# Patient Record
Sex: MALE | Race: WHITE | NOT HISPANIC OR LATINO | Employment: UNEMPLOYED | URBAN - METROPOLITAN AREA
[De-identification: names, ages, dates, MRNs, and addresses within clinical notes are randomized per-mention and may not be internally consistent; named-entity substitution may affect disease eponyms.]

---

## 2018-01-05 ENCOUNTER — HISTORICAL (OUTPATIENT)
Dept: RADIOLOGY | Facility: HOSPITAL | Age: 48
End: 2018-01-05

## 2018-06-08 ENCOUNTER — HISTORICAL (OUTPATIENT)
Dept: INTERNAL MEDICINE | Facility: CLINIC | Age: 48
End: 2018-06-08

## 2018-06-08 LAB
ABS NEUT (OLG): 3.49 X10(3)/MCL (ref 2.1–9.2)
ALBUMIN SERPL-MCNC: 4 GM/DL (ref 3.4–5)
ALBUMIN/GLOB SERPL: 1 RATIO (ref 1–2)
ALP SERPL-CCNC: 71 UNIT/L (ref 45–117)
ALT SERPL-CCNC: 24 UNIT/L (ref 12–78)
APPEARANCE, UA: ABNORMAL
AST SERPL-CCNC: 17 UNIT/L (ref 15–37)
BACTERIA #/AREA URNS AUTO: ABNORMAL /[HPF]
BASOPHILS # BLD AUTO: 0.02 X10(3)/MCL
BASOPHILS NFR BLD AUTO: 0 %
BILIRUB SERPL-MCNC: 0.8 MG/DL (ref 0.2–1)
BILIRUB UR QL STRIP: NEGATIVE
BILIRUBIN DIRECT+TOT PNL SERPL-MCNC: 0.2 MG/DL
BILIRUBIN DIRECT+TOT PNL SERPL-MCNC: 0.6 MG/DL
BUN SERPL-MCNC: 22 MG/DL (ref 7–18)
CALCIUM SERPL-MCNC: 9 MG/DL (ref 8.5–10.1)
CHLORIDE SERPL-SCNC: 106 MMOL/L (ref 98–107)
CHOLEST SERPL-MCNC: 170 MG/DL
CHOLEST/HDLC SERPL: 3.2 {RATIO} (ref 0–5)
CO2 SERPL-SCNC: 27 MMOL/L (ref 21–32)
COLOR UR: YELLOW
CREAT SERPL-MCNC: 1.3 MG/DL (ref 0.6–1.3)
EOSINOPHIL # BLD AUTO: 0.07 X10(3)/MCL
EOSINOPHIL NFR BLD AUTO: 1 %
ERYTHROCYTE [DISTWIDTH] IN BLOOD BY AUTOMATED COUNT: 13.2 % (ref 11.5–14.5)
EST. AVERAGE GLUCOSE BLD GHB EST-MCNC: 123 MG/DL
GLOBULIN SER-MCNC: 3.8 GM/ML (ref 2.3–3.5)
GLUCOSE (UA): NORMAL
GLUCOSE SERPL-MCNC: 94 MG/DL (ref 74–106)
HAV IGM SERPL QL IA: NONREACTIVE
HBA1C MFR BLD: 5.9 % (ref 4.2–6.3)
HBV CORE IGM SERPL QL IA: NONREACTIVE
HBV SURFACE AG SERPL QL IA: NEGATIVE
HCT VFR BLD AUTO: 48 % (ref 40–51)
HCV AB SERPL QL IA: NONREACTIVE
HDLC SERPL-MCNC: 53 MG/DL
HGB BLD-MCNC: 16.2 GM/DL (ref 13.5–17.5)
HGB UR QL STRIP: NEGATIVE
HIV 1+2 AB+HIV1 P24 AG SERPL QL IA: NONREACTIVE
HYALINE CASTS #/AREA URNS LPF: ABNORMAL /[LPF]
IMM GRANULOCYTES # BLD AUTO: 0.01 10*3/UL
IMM GRANULOCYTES NFR BLD AUTO: 0 %
KETONES UR QL STRIP: ABNORMAL
LDLC SERPL CALC-MCNC: 93 MG/DL (ref 0–130)
LEUKOCYTE ESTERASE UR QL STRIP: NEGATIVE
LYMPHOCYTES # BLD AUTO: 2.25 X10(3)/MCL
LYMPHOCYTES NFR BLD AUTO: 36 % (ref 13–40)
MCH RBC QN AUTO: 29.2 PG (ref 26–34)
MCHC RBC AUTO-ENTMCNC: 33.8 GM/DL (ref 31–37)
MCV RBC AUTO: 86.6 FL (ref 80–100)
MONOCYTES # BLD AUTO: 0.46 X10(3)/MCL
MONOCYTES NFR BLD AUTO: 7 % (ref 4–12)
NEUTROPHILS # BLD AUTO: 3.49 X10(3)/MCL
NEUTROPHILS NFR BLD AUTO: 55 X10(3)/MCL
NITRITE UR QL STRIP: NEGATIVE
PH UR STRIP: 5.5 [PH] (ref 4.5–8)
PLATELET # BLD AUTO: 196 X10(3)/MCL (ref 130–400)
PMV BLD AUTO: 10.9 FL (ref 7.4–10.4)
POTASSIUM SERPL-SCNC: 3.9 MMOL/L (ref 3.5–5.1)
PROT SERPL-MCNC: 7.8 GM/DL (ref 6.4–8.2)
PROT UR QL STRIP: 20 MG/DL
RBC # BLD AUTO: 5.54 X10(6)/MCL (ref 4.5–5.9)
RBC #/AREA URNS AUTO: ABNORMAL /[HPF]
SODIUM SERPL-SCNC: 143 MMOL/L (ref 136–145)
SP GR UR STRIP: 1.03 (ref 1–1.03)
SQUAMOUS #/AREA URNS LPF: ABNORMAL /[LPF]
TRIGL SERPL-MCNC: 122 MG/DL
TSH SERPL-ACNC: 1.06 MIU/L (ref 0.36–3.74)
UROBILINOGEN UR STRIP-ACNC: NORMAL
VLDLC SERPL CALC-MCNC: 24 MG/DL
WBC # SPEC AUTO: 6.3 X10(3)/MCL (ref 4.5–11)
WBC #/AREA URNS AUTO: ABNORMAL /HPF

## 2018-06-28 ENCOUNTER — HISTORICAL (OUTPATIENT)
Dept: ENDOSCOPY | Facility: HOSPITAL | Age: 48
End: 2018-06-28

## 2019-04-10 ENCOUNTER — HISTORICAL (OUTPATIENT)
Dept: INTERNAL MEDICINE | Facility: CLINIC | Age: 49
End: 2019-04-10

## 2019-04-10 LAB
ABS NEUT (OLG): 3.49 X10(3)/MCL (ref 2.1–9.2)
ALBUMIN SERPL-MCNC: 4.2 GM/DL (ref 3.4–5)
ALBUMIN/GLOB SERPL: 1.2 RATIO (ref 1.1–2)
ALP SERPL-CCNC: 77 UNIT/L (ref 45–117)
ALT SERPL-CCNC: 27 UNIT/L (ref 12–78)
AST SERPL-CCNC: 18 UNIT/L (ref 15–37)
BASOPHILS # BLD AUTO: 0.04 X10(3)/MCL
BASOPHILS NFR BLD AUTO: 1 %
BILIRUB SERPL-MCNC: 0.5 MG/DL (ref 0.2–1)
BILIRUBIN DIRECT+TOT PNL SERPL-MCNC: 0.2 MG/DL
BILIRUBIN DIRECT+TOT PNL SERPL-MCNC: 0.3 MG/DL
BUN SERPL-MCNC: 24 MG/DL (ref 7–18)
CALCIUM SERPL-MCNC: 9.3 MG/DL (ref 8.5–10.1)
CHLORIDE SERPL-SCNC: 106 MMOL/L (ref 98–107)
CHOLEST SERPL-MCNC: 178 MG/DL
CHOLEST/HDLC SERPL: 3.5 {RATIO} (ref 0–5)
CO2 SERPL-SCNC: 28 MMOL/L (ref 21–32)
CREAT SERPL-MCNC: 1 MG/DL (ref 0.6–1.3)
EOSINOPHIL # BLD AUTO: 0.08 10*3/UL
EOSINOPHIL NFR BLD AUTO: 1 %
ERYTHROCYTE [DISTWIDTH] IN BLOOD BY AUTOMATED COUNT: 12.4 % (ref 11.5–14.5)
EST. AVERAGE GLUCOSE BLD GHB EST-MCNC: 103 MG/DL
GLOBULIN SER-MCNC: 3.6 GM/ML (ref 2.3–3.5)
GLUCOSE SERPL-MCNC: 92 MG/DL (ref 74–106)
HBA1C MFR BLD: 5.2 % (ref 4.2–6.3)
HCT VFR BLD AUTO: 47.7 % (ref 40–51)
HDLC SERPL-MCNC: 51 MG/DL
HGB BLD-MCNC: 15.7 GM/DL (ref 13.5–17.5)
IMM GRANULOCYTES # BLD AUTO: 0.01 10*3/UL
IMM GRANULOCYTES NFR BLD AUTO: 0 %
LDLC SERPL CALC-MCNC: 105 MG/DL (ref 0–130)
LYMPHOCYTES # BLD AUTO: 2.47 X10(3)/MCL
LYMPHOCYTES NFR BLD AUTO: 37 % (ref 13–40)
MCH RBC QN AUTO: 28.4 PG (ref 26–34)
MCHC RBC AUTO-ENTMCNC: 32.9 GM/DL (ref 31–37)
MCV RBC AUTO: 86.4 FL (ref 80–100)
MONOCYTES # BLD AUTO: 0.53 X10(3)/MCL
MONOCYTES NFR BLD AUTO: 8 % (ref 4–12)
NEUTROPHILS # BLD AUTO: 3.49 X10(3)/MCL
NEUTROPHILS NFR BLD AUTO: 53 X10(3)/MCL
PLATELET # BLD AUTO: 221 X10(3)/MCL (ref 130–400)
PMV BLD AUTO: 10.4 FL (ref 7.4–10.4)
POTASSIUM SERPL-SCNC: 4 MMOL/L (ref 3.5–5.1)
PROT SERPL-MCNC: 7.8 GM/DL (ref 6.4–8.2)
RBC # BLD AUTO: 5.52 X10(6)/MCL (ref 4.5–5.9)
SODIUM SERPL-SCNC: 141 MMOL/L (ref 136–145)
TRIGL SERPL-MCNC: 108 MG/DL
TSH SERPL-ACNC: 1.14 MIU/L (ref 0.36–3.74)
VLDLC SERPL CALC-MCNC: 22 MG/DL
WBC # SPEC AUTO: 6.6 X10(3)/MCL (ref 4.5–11)

## 2019-10-09 ENCOUNTER — HISTORICAL (OUTPATIENT)
Dept: INTERNAL MEDICINE | Facility: CLINIC | Age: 49
End: 2019-10-09

## 2020-05-15 ENCOUNTER — HISTORICAL (OUTPATIENT)
Dept: INTERNAL MEDICINE | Facility: CLINIC | Age: 50
End: 2020-05-15

## 2020-05-15 LAB
ABS NEUT (OLG): 2.82 X10(3)/MCL (ref 2.1–9.2)
ALBUMIN SERPL-MCNC: 3.7 GM/DL (ref 3.4–5)
ALBUMIN/GLOB SERPL: 1 RATIO (ref 1.1–2)
ALP SERPL-CCNC: 69 UNIT/L (ref 45–117)
ALT SERPL-CCNC: 24 UNIT/L (ref 12–78)
AST SERPL-CCNC: 18 UNIT/L (ref 15–37)
BASOPHILS # BLD AUTO: 0 X10(3)/MCL (ref 0–0.2)
BASOPHILS NFR BLD AUTO: 0 %
BILIRUB SERPL-MCNC: 0.6 MG/DL (ref 0.2–1)
BILIRUBIN DIRECT+TOT PNL SERPL-MCNC: 0.2 MG/DL (ref 0–0.2)
BILIRUBIN DIRECT+TOT PNL SERPL-MCNC: 0.4 MG/DL
BUN SERPL-MCNC: 17 MG/DL (ref 7–18)
CALCIUM SERPL-MCNC: 8.9 MG/DL (ref 8.5–10.1)
CHLORIDE SERPL-SCNC: 108 MMOL/L (ref 98–107)
CHOLEST SERPL-MCNC: 167 MG/DL
CHOLEST/HDLC SERPL: 3.3 {RATIO} (ref 0–5)
CO2 SERPL-SCNC: 28 MMOL/L (ref 21–32)
CREAT SERPL-MCNC: 1.1 MG/DL (ref 0.6–1.3)
EOSINOPHIL # BLD AUTO: 0.1 X10(3)/MCL (ref 0–0.9)
EOSINOPHIL NFR BLD AUTO: 2 %
ERYTHROCYTE [DISTWIDTH] IN BLOOD BY AUTOMATED COUNT: 13.1 % (ref 11.5–14.5)
EST. AVERAGE GLUCOSE BLD GHB EST-MCNC: 120 MG/DL
GLOBULIN SER-MCNC: 3.8 GM/ML (ref 2.3–3.5)
GLUCOSE SERPL-MCNC: 100 MG/DL (ref 74–106)
HBA1C MFR BLD: 5.8 % (ref 4.2–6.3)
HCT VFR BLD AUTO: 46.9 % (ref 40–51)
HDLC SERPL-MCNC: 50 MG/DL (ref 40–59)
HGB BLD-MCNC: 15.4 GM/DL (ref 13.5–17.5)
IMM GRANULOCYTES # BLD AUTO: 0.01 10*3/UL
IMM GRANULOCYTES NFR BLD AUTO: 0 %
LDLC SERPL CALC-MCNC: 100 MG/DL
LYMPHOCYTES # BLD AUTO: 2.1 X10(3)/MCL (ref 0.6–4.6)
LYMPHOCYTES NFR BLD AUTO: 37 %
MCH RBC QN AUTO: 27.9 PG (ref 26–34)
MCHC RBC AUTO-ENTMCNC: 32.8 GM/DL (ref 31–37)
MCV RBC AUTO: 85 FL (ref 80–100)
MONOCYTES # BLD AUTO: 0.6 X10(3)/MCL (ref 0.1–1.3)
MONOCYTES NFR BLD AUTO: 10 %
NEUTROPHILS # BLD AUTO: 2.82 X10(3)/MCL (ref 2.1–9.2)
NEUTROPHILS NFR BLD AUTO: 50 %
PLATELET # BLD AUTO: 198 X10(3)/MCL (ref 130–400)
PMV BLD AUTO: 10.8 FL (ref 7.4–10.4)
POTASSIUM SERPL-SCNC: 4.2 MMOL/L (ref 3.5–5.1)
PROT SERPL-MCNC: 7.5 GM/DL (ref 6.4–8.2)
RBC # BLD AUTO: 5.52 X10(6)/MCL (ref 4.5–5.9)
SODIUM SERPL-SCNC: 140 MMOL/L (ref 136–145)
TRIGL SERPL-MCNC: 83 MG/DL
TSH SERPL-ACNC: 0.86 MIU/L (ref 0.36–3.74)
VLDLC SERPL CALC-MCNC: 17 MG/DL
WBC # SPEC AUTO: 5.7 X10(3)/MCL (ref 4.5–11)

## 2020-05-29 ENCOUNTER — HISTORICAL (OUTPATIENT)
Dept: RADIOLOGY | Facility: HOSPITAL | Age: 50
End: 2020-05-29

## 2021-06-21 ENCOUNTER — HISTORICAL (OUTPATIENT)
Dept: INTERNAL MEDICINE | Facility: CLINIC | Age: 51
End: 2021-06-21

## 2021-06-24 ENCOUNTER — HISTORICAL (OUTPATIENT)
Dept: ADMINISTRATIVE | Facility: HOSPITAL | Age: 51
End: 2021-06-24

## 2021-10-13 ENCOUNTER — HISTORICAL (OUTPATIENT)
Dept: GASTROENTEROLOGY | Facility: CLINIC | Age: 51
End: 2021-10-13

## 2021-10-15 ENCOUNTER — HISTORICAL (OUTPATIENT)
Dept: ENDOSCOPY | Facility: HOSPITAL | Age: 51
End: 2021-10-15

## 2022-01-25 ENCOUNTER — HISTORICAL (OUTPATIENT)
Dept: ADMINISTRATIVE | Facility: HOSPITAL | Age: 52
End: 2022-01-25

## 2022-01-25 LAB
ALBUMIN SERPL-MCNC: 4.1 GM/DL (ref 3.5–5)
ALBUMIN/GLOB SERPL: 1 RATIO (ref 1.1–2)
ALP SERPL-CCNC: 76 UNIT/L (ref 40–150)
ALT SERPL-CCNC: 21 UNIT/L (ref 0–55)
APPEARANCE, UA: CLEAR
AST SERPL-CCNC: 18 UNIT/L (ref 5–34)
BACTERIA SPEC CULT: ABNORMAL /HPF
BILIRUB SERPL-MCNC: 0.5 MG/DL
BILIRUB UR QL STRIP: NEGATIVE
BILIRUBIN DIRECT+TOT PNL SERPL-MCNC: 0.2 MG/DL (ref 0–0.5)
BILIRUBIN DIRECT+TOT PNL SERPL-MCNC: 0.3 MG/DL (ref 0–0.8)
BUN SERPL-MCNC: 23.2 MG/DL (ref 8.4–25.7)
CALCIUM SERPL-MCNC: 9.7 MG/DL (ref 8.7–10.5)
CHLORIDE SERPL-SCNC: 105 MMOL/L (ref 98–107)
CO2 SERPL-SCNC: 27 MMOL/L (ref 22–29)
COLOR UR: ABNORMAL
CREAT SERPL-MCNC: 1.13 MG/DL (ref 0.73–1.18)
EST CREAT CLEARANCE SER (OHS): 72.13 ML/MIN
GLOBULIN SER-MCNC: 4 GM/DL (ref 2.4–3.5)
GLUCOSE (UA): NORMAL /UL
GLUCOSE SERPL-MCNC: 104 MG/DL (ref 74–100)
HGB UR QL STRIP: NEGATIVE /HPF
HYALINE CASTS #/AREA URNS LPF: ABNORMAL /LPF
KETONES UR QL STRIP: NEGATIVE /UL
LEUKOCYTE ESTERASE UR QL STRIP: NEGATIVE
MUCOUS THREADS URNS QL MICRO: ABNORMAL /LPF
NITRITE UR QL STRIP: NEGATIVE
PH UR STRIP: 5.5 /UL (ref 4.5–8)
POTASSIUM SERPL-SCNC: 3.9 MMOL/L (ref 3.5–5.1)
PROT SERPL-MCNC: 8.1 GM/DL (ref 6.4–8.3)
PROT UR QL STRIP: NEGATIVE /UL
RBC #/AREA URNS HPF: ABNORMAL /HPF
SODIUM SERPL-SCNC: 139 MMOL/L (ref 136–145)
SP GR UR STRIP: 1.02 (ref 1–1.03)
SQUAMOUS EPITHELIAL, UA: ABNORMAL /HPF
UROBILINOGEN UR STRIP-ACNC: NORMAL /HPF
WBC #/AREA URNS HPF: ABNORMAL /HPF

## 2022-02-11 ENCOUNTER — HISTORICAL (OUTPATIENT)
Dept: ADMINISTRATIVE | Facility: HOSPITAL | Age: 52
End: 2022-02-11

## 2022-02-11 ENCOUNTER — HISTORICAL (OUTPATIENT)
Dept: RADIOLOGY | Facility: HOSPITAL | Age: 52
End: 2022-02-11

## 2022-02-22 ENCOUNTER — HISTORICAL (OUTPATIENT)
Dept: INTERNAL MEDICINE | Facility: CLINIC | Age: 52
End: 2022-02-22

## 2022-02-22 LAB
ABS NEUT (OLG): 4.58 (ref 2.1–9.2)
BASOPHILS # BLD AUTO: 0 10*3/UL (ref 0–0.2)
BASOPHILS NFR BLD AUTO: 0 %
EOSINOPHIL # BLD AUTO: 0.1 10*3/UL (ref 0–0.9)
EOSINOPHIL NFR BLD AUTO: 2 %
ERYTHROCYTE [DISTWIDTH] IN BLOOD BY AUTOMATED COUNT: 14 % (ref 11.5–14.5)
FLAG2 (OHS): 50
FLAG3 (OHS): 80
FLAGS (OHS): 90
HCT VFR BLD AUTO: 49 % (ref 40–51)
HGB BLD-MCNC: 16.2 G/DL (ref 13.5–17.5)
IMM GRANULOCYTES # BLD AUTO: 0.02 10*3/UL
IMM GRANULOCYTES NFR BLD AUTO: 0 %
LOW EVENT # SUSPECT FLAG (OHS): 90
LYMPHOCYTES # BLD AUTO: 2.4 10*3/UL (ref 0.6–4.6)
LYMPHOCYTES NFR BLD AUTO: 30 %
MANUAL DIFF? (OHS): NO
MCH RBC QN AUTO: 27.5 PG (ref 26–34)
MCHC RBC AUTO-ENTMCNC: 33.1 G/DL (ref 31–37)
MCV RBC AUTO: 83.1 FL (ref 80–100)
MO BLASTS SUSPECT FLAG (OHS): 40
MONOCYTES # BLD AUTO: 0.7 10*3/UL (ref 0.1–1.3)
MONOCYTES NFR BLD AUTO: 9 %
NEUTROPHILS # BLD AUTO: 4.58 10*3/UL (ref 2.1–9.2)
NEUTROPHILS NFR BLD AUTO: 58 %
NRBC BLD AUTO-RTO: 0 % (ref 0–0.2)
PLATELET # BLD AUTO: 224 10*3/UL (ref 130–400)
PLATELET CLUMPS SUSPECT FLAG (OHS): 10
PMV BLD AUTO: 11 FL (ref 7.4–10.4)
RBC # BLD AUTO: 5.9 10*6/UL (ref 4.5–5.9)
WBC # SPEC AUTO: 7.9 10*3/UL (ref 4.5–11)

## 2022-04-07 ENCOUNTER — HISTORICAL (OUTPATIENT)
Dept: ADMINISTRATIVE | Facility: HOSPITAL | Age: 52
End: 2022-04-07

## 2022-04-08 ENCOUNTER — HISTORICAL (OUTPATIENT)
Dept: GASTROENTEROLOGY | Facility: CLINIC | Age: 52
End: 2022-04-08

## 2022-04-08 LAB — SARS-COV-2 AG RESP QL IA.RAPID: NEGATIVE

## 2022-04-11 ENCOUNTER — HISTORICAL (OUTPATIENT)
Dept: ENDOSCOPY | Facility: HOSPITAL | Age: 52
End: 2022-04-11

## 2022-04-23 VITALS
BODY MASS INDEX: 33.26 KG/M2 | HEIGHT: 67 IN | SYSTOLIC BLOOD PRESSURE: 124 MMHG | WEIGHT: 211.88 LBS | DIASTOLIC BLOOD PRESSURE: 86 MMHG

## 2022-05-01 NOTE — HISTORICAL [facility] CERNER
This is a historical note converted from Ronald. Formatting and pictures may have been removed.  Please reference Ronald for original formatting and attached multimedia. Chief Complaint  Check up. Want to be checked for auto immune hepatitis. 2. Back pain  History of Present Illness  49 yo m with obesity tennis elbow gerd htn asa back pain chronic neuropathy asa  Review of Systems  neg cv, neg pulm, neg gi gu ros as in hpi  Physical Exam  Vitals & Measurements  T:?36.6? ?C (Oral)? HR:?62(Peripheral)? RR:?18? BP:?132/82?  HT:?170.00?cm? WT:?92.300?kg? BMI:?31.94?  aax4 nad  juliette eomi  cv rrr s1s2 no mgr  lungs cta no cr or whz  abd nt soft  ext no edema  neuro intact  Assessment/Plan  1.?Chronic GERD?K21.9  ?no com controlled? on current meds  ?  2.?Obesity?E66.9  ?dash  ?  3.?Joint pain?M25.50  ?no com  Ordered:  Actin (Smooth Muscle) Antibody-[facility] [id number], Routine collect, *Est. 06/24/21 3:00:00 CDT, Blood, Order for future visit, *Est. Stop date 06/24/21 3:00:00 CDT, Lab Collect, Joint pain, 06/24/21 7:38:00 CDT  CBC w/ Auto Diff, Routine collect, *Est. 06/24/21 3:00:00 CDT, Blood, Order for future visit, *Est. Stop date 06/24/21 3:00:00 CDT, Lab Collect, Joint pain, 06/24/21 7:38:00 CDT  Clinic Follow up, *Est. 12/24/21 3:00:00 CST, Order for future visit, Joint pain, [facility]  Comprehensive Metabolic Panel, Routine collect, *Est. 06/24/21 3:00:00 CDT, Blood, Order for future visit, *Est. Stop date 06/24/21 3:00:00 CDT, Lab Collect, Joint pain, 06/24/21 7:38:00 CDT  Free T4, Routine collect, *Est. 06/24/21 3:00:00 CDT, Blood, Order for future visit, *Est. Stop date 06/24/21 3:00:00 CDT, Lab Collect, Joint pain, 06/24/21 7:38:00 CDT  Hemoglobin A1C [facility], Routine collect, *Est. 06/24/21 3:00:00 CDT, Blood, Order for future visit, *Est. Stop date 06/24/21 3:00:00 CDT, Lab Collect, Joint pain, 06/24/21 7:38:00 CDT  Lipid Panel, Routine collect, *Est. 06/24/21 3:00:00 CDT, Blood, Order for future  visit, *Est. Stop date 06/24/21 3:00:00 CDT, Lab Collect, Joint pain, 06/24/21 7:38:00 CDT  Occult Blood Fecal Immunoassay, Routine collect, Stool, Order for future visit, *Est. 07/01/21 3:00:00 CDT, *Est. Stop date 07/01/21 3:00:00 CDT, Nurse collect, Joint pain  Thyroid Stimulating Hormone, Routine collect, *Est. 06/24/21 3:00:00 CDT, Blood, Order for future visit, *Est. Stop date 06/24/21 3:00:00 CDT, Lab Collect, Joint pain, 06/24/21 7:38:00 CDT  ?  4.?Back pain?M54.9  ?chronic hx back sx x ray  Ordered:  XR Spine Lumbar 2 or 3 Views, Routine, 06/24/21 7:38:00 CDT, None, Wheelchair, Patient Has IV?, Rad Type, Order for future visit, Back pain, Not Scheduled, 06/24/21 7:38:00 CDT  ?  Orders:  zoster vaccine, inactivated, 0.5 mL, form: Powder-Inj, IM, Once-Unscheduled, first dose 06/24/21 7:25:00 CDT  Office/Outpatient Visit Level 4 Established [id number] PC, HTN (hypertension), 06/24/21 7:37:00 CDT  Referrals  Clinic Follow up, *Est. 12/24/21 3:00:00 CST, Order for future visit, Joint pain, [facility]   Problem List/Past Medical History  Ongoing  Chronic GERD  HTN (hypertension)  Obesity  Historical  No qualifying data  Procedure/Surgical History  Drainage of Right Lower Leg Subcutaneous Tissue and Fascia, Open Approach (09/06/2019)  Incision and drainage of abscess (eg, carbuncle, suppurative hidradenitis, cutaneous or subcutaneous abscess, cyst, furuncle, or paronychia); complicated or multiple (09/06/2019)  Drainage of Right Lower Arm Subcutaneous Tissue and Fascia, Open Approach (05/26/2019)  Incision and drainage of abscess (eg, carbuncle, suppurative hidradenitis, cutaneous or subcutaneous abscess, cyst, furuncle, or paronychia); simple or single (05/26/2019)  Biopsy Gastrointestinal (06/28/2018)  Esophagogastroduodenoscopy (06/28/2018)  Esophagogastroduodenoscopy, flexible, transoral; with biopsy, single or multiple (06/28/2018)  Excision of Stomach, Via Natural or Artificial Opening Endoscopic  (06/28/2018)  Incision and drainage of abscess (eg, carbuncle, suppurative hidradenitis, cutaneous or subcutaneous abscess, cyst, furuncle, or paronychia); complicated or multiple (06/10/2017)  Cholecystectomy (None) (10/07/2016)  Inspection of Gallbladder, Percutaneous Endoscopic Approach (10/07/2016)  Resection of Gallbladder, Open Approach (10/07/2016)  Posterior vertebral joint(s) arthroplasty (eg, facet joint[s] replacement), including facetectomy, laminectomy, foraminotomy, and vertebral column fixation, injection of bone cement, when performed, including fluoroscopy, single level, lumbar spine   Medications  amLODIPine 5 mg oral tablet, 5 mg= 1 tab(s), Oral, Daily, 3 refills,? ?Not taking: dulp  amLODIPine 5 mg oral tablet, 5 mg= 1 tab(s), Oral, Daily, 3 refills  baclofen 10 mg oral tablet, See Instructions,? ?Not taking  gabapentin 300 mg oral capsule, 300 mg= 1 cap(s), Oral, TID, 5 refills,? ?Not taking  ibuprofen 800 mg oral tablet, 800 mg= 1 tab(s), Oral, TID, PRN, 2 refills,? ?Not taking: dulp  ibuprofen 800 mg oral tablet, 800 mg= 1 tab(s), Oral, TID, PRN, 1 refills  Norco 5 mg-325 mg oral tablet, 1 tab(s), Oral, q4hr, PRN,? ?Not taking: Last Dose Date/Time Unknown  omeprazole 20 mg oral DR capsule, 20 mg= 1 cap(s), Oral, Daily, 3 refills  Questran Light 4 g/5 g oral powder for reconstitution, 4 gm, Oral, QID, 11 refills,? ?Not taking: Last Dose Date/Time Unknown  Shingrix intramuscular injection, 0.5 mL, IM, Once-Unscheduled  Allergies  No Known Medication Allergies  Social History  Abuse/Neglect  No, No, Yes, 06/24/2021  No, No, Yes, 12/24/2020  No, No, Yes, 05/22/2020  Alcohol  Current, Beer, 3-5 times per week, Alcohol use interferes with work or home: No. Others hurt by drinking: No. Household alcohol concerns: No., 06/24/2021  Current, Beer, 3-5 times per week, 12/24/2020  Beer, 3-5 times per week, 4 drinks/episode average., 06/27/2018  Current, 1-2 times per week,  10/06/2016  Employment/School  disabled, Highest education level: High school. Operates hazardous equipment: No., 10/24/2018  Exercise  Self assessment: Fair condition., 10/24/2018  Home/Environment  Lives with Significant other. Living situation: Home/Independent. Alcohol abuse in household: No. Substance abuse in household: No. Smoker in household: No. Injuries/Abuse/Neglect in household: No. Feels unsafe at home: No. Family/Friends available for support: Yes. Concern for family members at home: No. Major illness in household: No. Financial concerns: No. TV/Computer concerns: No., 10/24/2018    Never in , 12/24/2020  Nutrition/Health  Regular, 10/20/2016  Sexual  Sexually active: Yes. Gender Identity Identifies as male., 12/24/2020  Spiritual/Cultural  Sikhism, 12/24/2020  Substance Use  Never, Drug use interferes with work/home: No. Household substance abuse concerns: No., 06/24/2021  Never, 12/24/2020  Never, 10/20/2016  Tobacco  Never (less than 100 in lifetime), N/A, 06/24/2021  Never (less than 100 in lifetime), N/A, 12/24/2020  Never (less than 100 in lifetime), N/A, 05/22/2020  Family History  Diabetes mellitus type 2: Mother and Father.  Hypertension.: Mother and Father.  Immunizations  Vaccine Date Status Comments   tetanus/diphtheria/pertussis, acel(Tdap) 06/05/2018 Given    influenza virus vaccine, inactivated - Not Given Patient Refuses     Health Maintenance  Health Maintenance  ???Pending?(in the next year)  ??? ??OverDue  ??? ? ? ?Influenza Vaccine due??10/01/20??and every 1??day(s)  ??? ? ? ?Alcohol Misuse Screening due??01/02/21??and every 1??year(s)  ??? ? ? ?Hypertension Management-BMP due??05/15/21??and every 1??year(s)  ??? ??Due?  ??? ? ? ?Aspirin Therapy for CVD Prevention due??06/24/21??and every 1??year(s)  ??? ? ? ?Colorectal Screening due??06/24/21??Unknown Frequency  ??? ? ? ?Hypertension Management-Education due??06/24/21??and every 1??year(s)  ??? ? ? ?Zoster Vaccine  due??06/24/21??Unknown Frequency  ??? ??Due In Future?  ??? ? ? ?Obesity Screening not due until??01/01/22??and every 1??year(s)  ??? ? ? ?Diabetes Screening not due until??06/21/22??and every 1??year(s)  ???Satisfied?(in the past 1 year)  ??? ??Satisfied?  ??? ? ? ?ADL Screening on??06/24/21.??Satisfied by Reshma Alston LPN  ??? ? ? ?Blood Pressure Screening on??06/24/21.??Satisfied by Reshma Alston LPN  ??? ? ? ?Body Mass Index Check on??06/24/21.??Satisfied by Reshma Alston LPN  ??? ? ? ?Depression Screening on??06/24/21.??Satisfied by Reshma Alston LPN  ??? ? ? ?Diabetes Screening on??06/21/21.??Satisfied by Funmilayo Presley  ??? ? ? ?Hypertension Management-Blood Pressure on??06/24/21.??Satisfied by Reshma Alston LPN  ??? ? ? ?Influenza Vaccine on??12/24/20.??Satisfied by Reshma Alston LPN  ??? ? ? ?Obesity Screening on??06/24/21.??Satisfied by Reshma Alston LPN  ??? ??Refused?  ??? ? ? ?Zoster Vaccine on??06/24/21.??Recorded by Reshma Alston LPN??Reason: Patient Refuses  ?

## 2022-05-01 NOTE — HISTORICAL [facility] CERNER
This is a historical note converted from [employer]ner. Formatting and pictures may have been removed.  Please reference Cerner for original formatting and attached multimedia. History of Present Illness  Patient is a 51 year old male with PMH of HTN, GERD, fall off ladder in 2003 where he broke his back with hardware in place (Dr. Stanton, Dr. Palacios), Obesity who presents to medicine clinic to establish care.  ?  Today, reports abdominal bloating periodically. Family notices he snores at night and sounds like he stops breathing, denies daytime sleepiness. Sleeps 5-6 hours per night uninterrupted. Reports SOB with minimal exertion or walking up a flight stairs. States he has left leg pain walking up stairs and numbness/tingling/shooting sharp pain in leg periodically. Had colonoscopy in 10/21, had 15 polyps removed 3 of which were pre-cancerous. Has follow up colonoscopy in 04/22 for surveillance.  ?  Allergies: NKDA  FH: heart disease, HTN, DM  SH: no tobacco use, drinks alcohol socially, no illicit drug use, currently on disability  PSH: cholecystectomy  Review of Systems  10 point ROS reviewed  Physical Exam  Vitals & Measurements  T:?36.5? ?C (Oral)? HR:?80(Peripheral)? RR:?18? BP:?124/86?  HT:?170.00?cm? WT:?96.100?kg? BMI:?33.25?  General:?well-developed well-nourished in no acute distress, abdominal obesity  Eye: PERRLA, EOMI, clear conjunctiva, eyelids normal  HENT:?AT, NC, no thyromegaly  Respiratory:?clear to auscultation bilaterally, symmetric expansion b/l  Cardiovascular:?regular rate and rhythm without murmurs, gallops or rubs, no peripheral edema  Gastrointestinal:?soft, non-tender, mildly-distended with normal bowel sounds, without masses to palpation  Musculoskeletal:?full range of motion of all extremities/spine without limitation or discomfort  Integumentary: no rashes or skin lesions present  Neurologic: AOx3, no signs of peripheral neurological deficit, motor/sensory function  intact  Assessment/Plan  HTN  continue amlodipine, BP controlled  ?  Abdominal bloating  Alcohol use disorder  RUQ US ordered along with Hep panel  ?  GERD  symptoms controlled, continue PPI  ?  Suspected CARL  sleep partner reports snoring at night, denies day time sleepiness  sleep study ordered  ?  SOB with exertion  reports previous work in an [employer] ([employer]) with possible hazardous chemical?exposures  no signs of volume overload on exam, lungs clear  echo ordered to r/o heart failure  ?  History of fall with hardware in place in lower back  symptoms controlled, not taking medicine for pain  reports occasional neuropathy in left lower extremity  ?  Obesity  follows [place] diet, drinks 4-6 beers every other night and cool aid  counselled regarding diet and exercise changes  ?  Prevention  Colonoscopy due 04/22  denies tobacco use  Zoster vaccine today, refuses Flu shot and Covid shot  Routine labs today, CBC/CMP/UA  RTC in 3 months  ?  Attending physician addendum  Patient discussed in clinic with medicine resident  Care provided is appropriate-await to see?results of echocardiogram and sleep study  Agree with above assessment and plan   Problem List/Past Medical History  Ongoing  Chronic GERD  HTN (hypertension)  Obesity  Historical  No qualifying data  Procedure/Surgical History  Colonoscopy (None) (10/15/2021)  Colonoscopy, flexible; with removal of tumor(s), polyp(s), or other lesion(s) by snare technique (10/15/2021)  Excision of Sigmoid Colon, Via Natural or Artificial Opening Endoscopic, Diagnostic (10/15/2021)  Excision of Transverse Colon, Via Natural or Artificial Opening Endoscopic, Diagnostic (10/15/2021)  Polypectomy (None) (10/15/2021)  Drainage of Right Lower Leg Subcutaneous Tissue and Fascia, Open Approach (09/06/2019)  Incision and drainage of abscess (eg, carbuncle, suppurative hidradenitis, cutaneous or subcutaneous abscess, cyst, furuncle, or paronychia); complicated or multiple  (09/06/2019)  Drainage of Right Lower Arm Subcutaneous Tissue and Fascia, Open Approach (05/26/2019)  Incision and drainage of abscess (eg, carbuncle, suppurative hidradenitis, cutaneous or subcutaneous abscess, cyst, furuncle, or paronychia); simple or single (05/26/2019)  Biopsy Gastrointestinal (06/28/2018)  Esophagogastroduodenoscopy (06/28/2018)  Esophagogastroduodenoscopy, flexible, transoral; with biopsy, single or multiple (06/28/2018)  Excision of Stomach, Via Natural or Artificial Opening Endoscopic (06/28/2018)  Incision and drainage of abscess (eg, carbuncle, suppurative hidradenitis, cutaneous or subcutaneous abscess, cyst, furuncle, or paronychia); complicated or multiple (06/10/2017)  Cholecystectomy (None) (10/07/2016)  Inspection of Gallbladder, Percutaneous Endoscopic Approach (10/07/2016)  Resection of Gallbladder, Open Approach (10/07/2016)  Posterior vertebral joint(s) arthroplasty (eg, facet joint[s] replacement), including facetectomy, laminectomy, foraminotomy, and vertebral column fixation, injection of bone cement, when performed, including fluoroscopy, single level, lumbar spine   Medications  amLODIPine 5 mg oral tablet, 5 mg= 1 tab(s), Oral, Daily, 3 refills  baclofen 10 mg oral tablet, See Instructions,? ?Not taking  ibuprofen 800 mg oral tablet, 800 mg= 1 tab(s), Oral, TID, PRN, 2 refills,? ?Not taking: dulp  Norco 5 mg-325 mg oral tablet, 1 tab(s), Oral, q4hr, PRN,? ?Not taking: Last Dose Date/Time Unknown  omeprazole 20 mg oral DR capsule, 20 mg= 1 cap(s), Oral, Daily, 3 refills  zoster vaccine, inactivated adjuvanted intramuscular injection, 0.5 mL, IM, Once-Unscheduled  Allergies  No Known Medication Allergies  Social History  Abuse/Neglect  No, No, Yes, 10/15/2021  Alcohol  Current, Beer, Daily, 10/15/2021  Employment/School  disabled, Highest education level: High school. Operates hazardous equipment: No., 10/24/2018  Exercise  Self assessment: Fair condition.,  10/24/2018  Home/Environment  Lives with Significant other. Living situation: Home/Independent. Alcohol abuse in household: No. Substance abuse in household: No. Smoker in household: No. Injuries/Abuse/Neglect in household: No. Feels unsafe at home: No. Family/Friends available for support: Yes. Concern for family members at home: No. Major illness in household: No. Financial concerns: No. TV/Computer concerns: No., 10/24/2018    Never in , 12/24/2020  Nutrition/Health  Regular, 10/20/2016  Sexual  Sexually active: Yes. Gender Identity Identifies as male., 12/24/2020  Spiritual/Cultural  Adventist, 12/24/2020  Substance Use  Never, 10/15/2021  Tobacco  Former smoker, quit more than 30 days ago, N/A, 10/15/2021  Family History  Diabetes mellitus type 2: Mother and Father.  Hypertension.: Mother and Father.  Immunizations  Vaccine Date Status Comments   zoster vaccine, inactivated 06/24/2021 Given    tetanus/diphtheria/pertussis, acel(Tdap) 06/05/2018 Given    influenza virus vaccine, inactivated - Not Given Patient Refuses     Health Maintenance  Health Maintenance  ???Pending?(in the next year)  ??? ??OverDue  ??? ? ? ?Influenza Vaccine due??10/01/21??and every 1??day(s)  ??? ??Due?  ??? ? ? ?Obesity Screening due??01/01/22??and every 1??year(s)  ??? ? ? ?Alcohol Misuse Screening due??01/02/22??and every 1??year(s)  ??? ? ? ?Aspirin Therapy for CVD Prevention due??01/25/22??and every 1??year(s)  ??? ? ? ?Hypertension Management-Education due??01/25/22??and every 1??year(s)  ??? ? ? ?Zoster Vaccine due??01/25/22??Unknown Frequency  ??? ??Due In Future?  ??? ? ? ?Blood Pressure Screening not due until??06/24/22??and every 1??year(s)  ??? ? ? ?Body Mass Index Check not due until??06/24/22??and every 1??year(s)  ??? ? ? ?Depression Screening not due until??06/24/22??and every 1??year(s)  ??? ? ? ?Diabetes Screening not due until??06/24/22??and every 1??year(s)  ??? ? ? ?Hypertension Management-Blood  Pressure not due until??06/24/22??and every 1??year(s)  ??? ? ? ?Hypertension Management-BMP not due until??06/24/22??and every 1??year(s)  ??? ? ? ?ADL Screening not due until??06/24/22??and every 1??year(s)  ???Satisfied?(in the past 1 year)  ??? ??Satisfied?  ??? ? ? ?ADL Screening on??06/24/21.??Satisfied by Reshma Alston LPN  ??? ? ? ?Blood Pressure Screening on??10/15/21.??Satisfied by Ruma Long RN  ??? ? ? ?Body Mass Index Check on??10/15/21.??Satisfied by Antonella Barth RN.  ??? ? ? ?Colorectal Screening on??10/15/21.  ??? ? ? ?Depression Screening on??06/24/21.??Satisfied by Reshma Alston LPN  ??? ? ? ?Diabetes Screening on??06/24/21.??Satisfied by Darshan Murillo Jr.  ??? ? ? ?Hypertension Management-Blood Pressure on??10/15/21.??Satisfied by Ruma Long RN  ??? ? ? ?Influenza Vaccine on??10/15/21.??Satisfied by Antonella Barth RN  ??? ? ? ?Lipid Screening on??06/24/21.??Satisfied by Darshan Murillo Jr.  ??? ? ? ?Obesity Screening on??10/15/21.??Satisfied by Antonella Barth RN  ??? ??Refused?  ??? ? ? ?Zoster Vaccine on??06/24/21.??Recorded by Reshma Alston LPN??Reason: Patient Refuses  ?

## 2022-05-05 ENCOUNTER — TELEPHONE (OUTPATIENT)
Dept: INTERNAL MEDICINE | Facility: CLINIC | Age: 52
End: 2022-05-05

## 2022-05-05 NOTE — TELEPHONE ENCOUNTER
Dr Lara,     Pt's wife called for recent results for stool sample that has been sent in.    Please advise.

## 2022-05-19 ENCOUNTER — HOSPITAL ENCOUNTER (EMERGENCY)
Facility: HOSPITAL | Age: 52
Discharge: HOME | End: 2022-05-19
Attending: STUDENT IN AN ORGANIZED HEALTH CARE EDUCATION/TRAINING PROGRAM
Payer: MEDICAID

## 2022-05-19 VITALS
HEART RATE: 65 BPM | SYSTOLIC BLOOD PRESSURE: 127 MMHG | RESPIRATION RATE: 16 BRPM | HEIGHT: 67 IN | OXYGEN SATURATION: 99 % | DIASTOLIC BLOOD PRESSURE: 81 MMHG | TEMPERATURE: 98 F | BODY MASS INDEX: 25.67 KG/M2 | WEIGHT: 163.56 LBS

## 2022-05-19 DIAGNOSIS — R10.31 RIGHT GROIN PAIN: Primary | ICD-10-CM

## 2022-05-19 DIAGNOSIS — K40.90 RIGHT INGUINAL HERNIA: ICD-10-CM

## 2022-05-19 PROCEDURE — 99282 EMERGENCY DEPT VISIT SF MDM: CPT

## 2022-05-19 NOTE — ED PROVIDER NOTES
Encounter Date: 5/19/2022       History     Chief Complaint   Patient presents with    Groin Pain     C/o right groin pain x 2 weeks. States has knot that he notices appeared. Denies any pain at present.      The patient is a 51-year-old  with past medical history for hypertension and GERD and chronic back pain after fall who presents to emergency department for a mass in right groin and occasional pain. Location is on right upper pubic area, no radiation to genitals or lower abdomen. The patient states he began to notice this approximately 2 weeks ago, his primary care physician is Dr. Lara who he saw on 04/22, mass was not present at that point.  The patient states that it is intermittent, he does not notice it all the time but is not sure what aggravates it or makes it worse.  Patient with no recent abdominal surgeries, no history of hernias in the past.  Patient states he has not had any dysuria, no pain on genital region.  Pain around the area does not aggravate the patient or limit his daily activities.  Patient has chronic pain in lower back, unsure if there is additional pain in the flank area. No other new complaints or other symptoms at this time.        Review of patient's allergies indicates:  No Known Allergies  Past Medical History:   Diagnosis Date    Hypertension      Past Surgical History:   Procedure Laterality Date    BACK SURGERY      CHOLECYSTECTOMY       History reviewed. No pertinent family history.  Social History     Tobacco Use    Smoking status: Former Smoker    Smokeless tobacco: Never Used   Substance Use Topics    Alcohol use: Not Currently    Drug use: Never     Review of Systems   Constitutional: Negative for chills and fever.   Respiratory: Negative for cough and shortness of breath.    Cardiovascular: Negative for chest pain and palpitations.   Gastrointestinal: Positive for abdominal pain. Negative for abdominal distention and nausea.   Genitourinary: Negative  for dysuria, flank pain, penile pain, penile swelling, scrotal swelling and testicular pain.   Musculoskeletal: Positive for back pain.   Neurological: Negative for weakness and numbness.       Physical Exam     Initial Vitals [05/19/22 1339]   BP Pulse Resp Temp SpO2   127/81 65 20 98.1 °F (36.7 °C) 99 %      MAP       --         Physical Exam    Constitutional: He appears well-developed and well-nourished.   HENT:   Head: Normocephalic and atraumatic.   Eyes: EOM are normal. Pupils are equal, round, and reactive to light.   Cardiovascular: Normal rate and regular rhythm.   Pulmonary/Chest: Breath sounds normal.   Abdominal: Abdomen is soft. Bowel sounds are normal. He exhibits no distension. There is no abdominal tenderness. Hernia confirmed positive in the right inguinal area. Hernia confirmed negative in the left inguinal area.   Genitourinary:    Testes and penis normal.   Cremasteric reflex is present.       Musculoskeletal:         General: Normal range of motion.     Lymphadenopathy: No inguinal adenopathy noted on the right or left side.   Neurological: He is alert and oriented to person, place, and time.   Skin: Skin is warm and dry. Capillary refill takes less than 2 seconds.         ED Course   Procedures  Labs Reviewed - No data to display       Imaging Results    None          Medications - No data to display  Medical Decision Making:   History:   Old Records Summarized: records from clinic visits and records from previous admission(s).  Initial Assessment:   Reducible Right Sided Direct Inguinal Hernia  Reducible Right Sided Indirect Inguinal Hernia  Femoral Hernia  Groin Pain  ED Management:  Performed physical exam that showed reducible hernia  No current pain or signs of strangulation  No indication for imaging or further workup at this time  Will refer for outpatient surgery clinic visit  Gave strict ED precautions for worsening pain or signs of strangulation                      Clinical  Impression:   Final diagnoses:  [R10.31] Right groin pain (Primary)  [K40.90] Right inguinal hernia          ED Disposition Condition    Discharge Stable        ED Prescriptions     None        Follow-up Information     Follow up With Specialties Details Why Contact Info    [facility] General Surgery Schedule an appointment as soon as possible for a visit in 1 week Will place referral [address] [facility] [address]  [phone]           Pete Salazar DO  Resident  05/19/22 3155

## 2022-05-21 NOTE — HISTORICAL [facility] CERNER
This is a historical note converted from [employer]. Formatting and pictures may have been removed.  Please reference [employer] for original formatting and attached multimedia. History of Present Illness  51M PMHx colonoscopy 10/2021 w/ 15 polyps found, 2 with high?grade dysplasia who is now presenting for repeat colonoscopy. Patient tolerated bowel prep in entirety and is having clear/yellow stools. Denies any abdominal pain, BRBPR, weight loss, fevers/chills.?He was recently diagnosed with kidney dysfunction and fatty liver. He changed his diet, eliminating fatty foods and more fruits/vegetable intake with recent 40 lbs weight loss in 2 months. Also noted increased loose/soft stools. No blood.  ?  ?  Review of Systems  12 point review of systems negative unless otherwise stated  Physical Exam  Vitals & Measurements  T:?37? ?C (Oral)? HR:?72(Monitored)? RR:?18? BP:?115/71? SpO2:?100%? WT:?76.2?kg? BMI:?27.32?  General: NAD. Laying in bed comfortably.  HEENT: Normocephalic. Atraumatic. Trachea midline.  CV: Regular rate.  Pulm: Nonlabored breathing. Symmetric chest expansion.  Abd: Soft. Nondistended. Nontender.  MSK: No calf tenderness.  Assessment/Plan  51M presenting for screening colonoscopy  ?  RBA of colonoscopy discussed with patient. Written consent obtained  Plan for colonoscopy with Dr. Santo  ?  ?  Aiyana Narayan MD  PGY2 General Surgery?   Problem List/Past Medical History  Ongoing  Chronic GERD  HTN (hypertension)  Obesity  Historical  No qualifying data  Procedure/Surgical History  Colonoscopy (None) (10/15/2021)  Colonoscopy, flexible; with removal of tumor(s), polyp(s), or other lesion(s) by snare technique (10/15/2021)  Excision of Sigmoid Colon, Via Natural or Artificial Opening Endoscopic, Diagnostic (10/15/2021)  Excision of Transverse Colon, Via Natural or Artificial Opening Endoscopic, Diagnostic (10/15/2021)  Polypectomy (None) (10/15/2021)  Drainage of Right Lower Leg Subcutaneous Tissue and  Fascia, Open Approach (09/06/2019)  Incision and drainage of abscess (eg, carbuncle, suppurative hidradenitis, cutaneous or subcutaneous abscess, cyst, furuncle, or paronychia); complicated or multiple (09/06/2019)  Drainage of Right Lower Arm Subcutaneous Tissue and Fascia, Open Approach (05/26/2019)  Incision and drainage of abscess (eg, carbuncle, suppurative hidradenitis, cutaneous or subcutaneous abscess, cyst, furuncle, or paronychia); simple or single (05/26/2019)  Biopsy Gastrointestinal (06/28/2018)  Esophagogastroduodenoscopy (06/28/2018)  Esophagogastroduodenoscopy, flexible, transoral; with biopsy, single or multiple (06/28/2018)  Excision of Stomach, Via Natural or Artificial Opening Endoscopic (06/28/2018)  Incision and drainage of abscess (eg, carbuncle, suppurative hidradenitis, cutaneous or subcutaneous abscess, cyst, furuncle, or paronychia); complicated or multiple (06/10/2017)  Cholecystectomy (None) (10/07/2016)  Inspection of Gallbladder, Percutaneous Endoscopic Approach (10/07/2016)  Resection of Gallbladder, Open Approach (10/07/2016)  Posterior vertebral joint(s) arthroplasty (eg, facet joint[s] replacement), including facetectomy, laminectomy, foraminotomy, and vertebral column fixation, injection of bone cement, when performed, including fluoroscopy, single level, lumbar spine   Medications  Inpatient  buffered lidocaine 2% - 0.5 ml syringe, 10 mg= 0.5 mL, Subcutaneous, As Directed  IVF Lactated Ringers LR Infusion 1,000 mL, 1000 mL, IV  Home  amLODIPine 5 mg oral tablet, 5 mg= 1 tab(s), Oral, Daily, 3 refills  omeprazole 20 mg oral DR capsule, 20 mg= 1 cap(s), Oral, Daily, 3 refills  Allergies  No Known Medication Allergies  Social History  Abuse/Neglect  No, 04/11/2022  Alcohol  Past, 04/08/2022  Employment/School  disabled, Highest education level: High school. Operates hazardous equipment: No., 10/24/2018  Exercise  Exercise frequency: Daily. Exercise type: Walking.,  01/25/2022  Home/Environment  Lives with Significant other. Living situation: Home/Independent. Alcohol abuse in household: No. Substance abuse in household: No. Smoker in household: No. Injuries/Abuse/Neglect in household: No. Feels unsafe at home: No. Family/Friends available for support: Yes. Concern for family members at home: No. Major illness in household: No. Financial concerns: No. TV/Computer concerns: No., 10/24/2018    Never in , 12/24/2020  Nutrition/Health  Regular, avoid acidic foods, Good, 01/25/2022  Sexual  Sexually active: Yes. Gender Identity Identifies as male., 12/24/2020  Spiritual/Cultural  Mandaeism, 12/24/2020  Substance Use  Never, 02/27/2022  Never, 10/15/2021  Tobacco  Former smoker, quit more than 30 days ago, N/A, 04/11/2022  Family History  Diabetes mellitus type 2: Mother and Father.  Hypertension.: Mother and Father.  Immunizations  Vaccine Date Status Comments   zoster vaccine, inactivated 01/25/2022 Given    zoster vaccine, inactivated 06/24/2021 Given    tetanus/diphtheria/pertussis, acel(Tdap) 06/05/2018 Given    influenza virus vaccine, inactivated - Not Given Patient Refuses         Patient with prior colonoscopy in October 2021 by Dr. Kuhn at?which time he had?a total of 15?polyps removed from the sigmoid and transverse, some measuring up to?30 mm in size?which were removed.? Pathology showed all tubular adenomas with 2 of the polyps showing focal high-grade dysplasia. he is here now for repeat colonoscopy.? He reports for the past 3 months his stools have been watery to loose, approximately 2 bowel movements a day?without any associated rectal bleeding or melena. ?He attributes this to change in his diet at which he is eating more?fruits and vegetables.? He was?previously a daily beer drinker, drinking sometimes 3-4 beers a day and more on the weekends. ?He reports not having any more beer for the past 3 months as well.? He denies any abdominal pain. ?His  weight is stable. ?He denies any family history of colon polyps or colon cancer.? This is his second colonoscopy.

## 2022-06-21 ENCOUNTER — OFFICE VISIT (OUTPATIENT)
Dept: SURGERY | Facility: CLINIC | Age: 52
End: 2022-06-21

## 2022-06-21 VITALS
OXYGEN SATURATION: 97 % | BODY MASS INDEX: 26.48 KG/M2 | HEIGHT: 65 IN | HEART RATE: 67 BPM | RESPIRATION RATE: 20 BRPM | SYSTOLIC BLOOD PRESSURE: 100 MMHG | WEIGHT: 158.94 LBS | TEMPERATURE: 98 F | DIASTOLIC BLOOD PRESSURE: 66 MMHG

## 2022-06-21 DIAGNOSIS — Z01.818 PRE-OP TESTING: ICD-10-CM

## 2022-06-21 DIAGNOSIS — K40.90 RIGHT INGUINAL HERNIA: Primary | ICD-10-CM

## 2022-06-21 DIAGNOSIS — R10.31 RIGHT GROIN PAIN: ICD-10-CM

## 2022-06-21 PROCEDURE — 99215 OFFICE O/P EST HI 40 MIN: CPT | Mod: PBBFAC

## 2022-06-21 RX ORDER — OMEPRAZOLE 20 MG/1
20 CAPSULE, DELAYED RELEASE ORAL DAILY
COMMUNITY
Start: 2022-03-09

## 2022-06-21 RX ORDER — SODIUM CHLORIDE 9 MG/ML
INJECTION, SOLUTION INTRAVENOUS CONTINUOUS
Status: CANCELLED | OUTPATIENT
Start: 2022-06-21

## 2022-06-21 RX ORDER — HEPARIN SODIUM 5000 [USP'U]/ML
5000 INJECTION, SOLUTION INTRAVENOUS; SUBCUTANEOUS ONCE
Status: CANCELLED | OUTPATIENT
Start: 2022-06-21

## 2022-06-21 NOTE — PROGRESS NOTES
Placed in room. Seen by Dr. Biswas. Spoke with patient. Surgery date is July 6, 2022. RTC in 2 weeks.

## 2022-06-21 NOTE — H&P (VIEW-ONLY)
General/Acute Care Surgery   History and Physical  Clinic Note     HD#0  POD#* No surgery found *    HPI  Patient is a 50yo M with PMHx of HTN, GERD, and back pain s/p surgical repair/fusion, and s/p lap cholecystectomy comes in for evaluation of right inguinal hernia. Patient states that he has noticed the bulge in his groin for the past 3-4 months after he recently lost a large amount of weight. Patient denies obstructive symptoms as well as nausea, vomiting, fever,chills, chest pain, SOB, motor/sensory changes, diarrhea, and constipation. No history of blood thinners.    Past Medical History: see above  Surgical History: lap jen, back surgery/fusion  Social History: quit smoking at the age of 21. Denies drug and EtOH use.    Review of Systems: 10 point ROS negative except as stated in HPI    Scheduled Meds:    Continuous Infusions:    PRN Meds:    Objective   GEN: NAD  NEURO: AAOx3  RESP: No increased WOB, equal rise and fall of the chest  CV: Regular rate  ABD: Soft, non tender, non distended. No guarding or rebound. Reducible right inguinal hernia present with no overlying skin changes.  : Miller none  EXT: Moving all extremities spontaneously     Labs  No results for input(s): WBC, HGB, HCT, PLT, PTT, INR in the last 72 hours.  No results for input(s): NA, K, CL, CO2, BUN, CREATININE, GLU, CALCIUM, MG, PHOS, PROT, ALBUMIN, BILITOT, AST, ALKPHOS, ALT in the last 72 hours.    Imaging  No orders to display        Assessment/Plan  50yo M with above PMHx comes to clinic for reducible right inguinal hernia.    - Scheduled for open right inguinal hernia repair with mesh July 6, 2022  - Consents obtained in clinic  - Case booked    Mitesh Love MD  [facility]    6/21/2022  11:34 AM

## 2022-06-21 NOTE — PROGRESS NOTES
General/Acute Care Surgery   History and Physical  Clinic Note     HD#0  POD#* No surgery found *    HPI  Patient is a 52yo M with PMHx of HTN, GERD, and back pain s/p surgical repair/fusion, and s/p lap cholecystectomy comes in for evaluation of right inguinal hernia. Patient states that he has noticed the bulge in his groin for the past 3-4 months after he recently lost a large amount of weight. Patient denies obstructive symptoms as well as nausea, vomiting, fever,chills, chest pain, SOB, motor/sensory changes, diarrhea, and constipation. No history of blood thinners.    Past Medical History: see above  Surgical History: lap jen, back surgery/fusion  Social History: quit smoking at the age of 21. Denies drug and EtOH use.    Review of Systems: 10 point ROS negative except as stated in HPI    Scheduled Meds:    Continuous Infusions:    PRN Meds:    Objective   GEN: NAD  NEURO: AAOx3  RESP: No increased WOB, equal rise and fall of the chest  CV: Regular rate  ABD: Soft, non tender, non distended. No guarding or rebound. Reducible right inguinal hernia present with no overlying skin changes.  : Miller none  EXT: Moving all extremities spontaneously     Labs  No results for input(s): WBC, HGB, HCT, PLT, PTT, INR in the last 72 hours.  No results for input(s): NA, K, CL, CO2, BUN, CREATININE, GLU, CALCIUM, MG, PHOS, PROT, ALBUMIN, BILITOT, AST, ALKPHOS, ALT in the last 72 hours.    Imaging  No orders to display        Assessment/Plan  52yo M with above PMHx comes to clinic for reducible right inguinal hernia.    - Scheduled for open right inguinal hernia repair with mesh July 6, 2022  - Consents obtained in clinic  - Case booked    Mitesh Love MD  [facility]    6/21/2022  11:34 AM

## 2022-06-22 ENCOUNTER — ANESTHESIA EVENT (OUTPATIENT)
Dept: SURGERY | Facility: HOSPITAL | Age: 52
End: 2022-06-22

## 2022-06-22 DIAGNOSIS — Z01.818 OTHER SPECIFIED PRE-OPERATIVE EXAMINATION: Primary | ICD-10-CM

## 2022-06-22 NOTE — ANESTHESIA PREPROCEDURE EVALUATION
06/22/2022  Samir Kimble is a 51 y.o., male with past HTN resolved after 50# weight loss, Hx abd swelling with nl ECHO and NO HX CHF, CARL    COVID STATUS: PREV. COVID=NEG; TEST DOS  BETA-BLOCKER: NONE    PAT NURSE PHONE INTERVIEW 6/28/22    PROBLEM LIST:  -  RIGHT INGUINAL HERNIA  -  HTN (PREV. on [facility])  -  CHF - 2/11/22 EF-55%, DOES NOT SEE CARDs  -  CKD - 4/22/22 GFR=84  -  CARL (per 1/25/22 DR. DELUNA) - NO Tx  -  GERD  -  CHRONIC BACK PAIN; Hx 2003 T10-L4 FUSION 2/2 T11 FRACTURE from FALL OFF LADDER  -  ETOH ABUSE - 1-2 BEERS/DAY MON-FRI, 12 PK on WEEKENDS    AM Rx DOS: OMEPRAZOLE     ORDERS -   SURGEON: 12/25/17 CXR; 1/20/18 EKG; 2/27/22 CBC; 4/22/22 CMP; NO NEW ORDERS  ANESTHESIA: ADD: EKG    Pre-op Assessment    I have reviewed the NPO Status.      Review of Systems  Anesthesia Hx:  Denies Family Hx of Anesthesia complications.   Denies Personal Hx of Anesthesia complications.       Physical Exam  General: Well nourished    Airway:  Mallampati: I   Mouth Opening: Normal  TM Distance: Normal  Tongue: Normal  Neck ROM: Normal ROM    Dental:  Intact  Missing many but denies loose  Chest/Lungs:  Clear to auscultation, Normal Respiratory Rate    Heart:  Rate: Normal  Rhythm: Regular Rhythm        Anesthesia Plan  Type of Anesthesia, risks & benefits discussed:    Anesthesia Type: Gen Supraglottic Airway  Intra-op Monitoring Plan: Standard ASA Monitors  Post Op Pain Control Plan: multimodal analgesia and IV/PO Opioids PRN  Induction:  IV  Airway Plan: Direct  Informed Consent: Informed consent signed with the Patient and all parties understand the risks and agree with anesthesia plan.  All questions answered.   ASA Score: 2  Day of Surgery Review of History & Physical: H&P Update referred to the surgeon/provider.I have interviewed and examined the patient. I have reviewed the patient's H&P dated: There are  no significant changes. H&P completed by Anesthesiologist.    Ready For Surgery From Anesthesia Perspective.     .          2/11/22 ECHO      12/25/17 CXR

## 2022-07-01 ENCOUNTER — OFFICE VISIT (OUTPATIENT)
Dept: PREADMISSION TESTING | Facility: HOSPITAL | Age: 52
End: 2022-07-01
Attending: SURGERY

## 2022-07-01 DIAGNOSIS — Z01.818 PRE-OP TESTING: ICD-10-CM

## 2022-07-01 DIAGNOSIS — Z01.818 OTHER SPECIFIED PRE-OPERATIVE EXAMINATION: ICD-10-CM

## 2022-07-01 PROCEDURE — 93005 ELECTROCARDIOGRAM TRACING: CPT

## 2022-07-05 ENCOUNTER — PATIENT MESSAGE (OUTPATIENT)
Dept: ADMINISTRATIVE | Facility: OTHER | Age: 52
End: 2022-07-05

## 2022-07-05 ENCOUNTER — PATIENT MESSAGE (OUTPATIENT)
Dept: SURGERY | Facility: HOSPITAL | Age: 52
End: 2022-07-05

## 2022-07-06 ENCOUNTER — HOSPITAL ENCOUNTER (OUTPATIENT)
Facility: HOSPITAL | Age: 52
Discharge: HOME | End: 2022-07-06
Attending: SURGERY | Admitting: SURGERY

## 2022-07-06 ENCOUNTER — ANESTHESIA (OUTPATIENT)
Dept: SURGERY | Facility: HOSPITAL | Age: 52
End: 2022-07-06

## 2022-07-06 DIAGNOSIS — K40.90 RIGHT INGUINAL HERNIA: Primary | ICD-10-CM

## 2022-07-06 DIAGNOSIS — R10.31 RIGHT GROIN PAIN: ICD-10-CM

## 2022-07-06 LAB
CTP QC/QA: YES
SARS-COV-2 AG RESP QL IA.RAPID: NEGATIVE

## 2022-07-06 PROCEDURE — 71000016 HC POSTOP RECOV ADDL HR: Performed by: SURGERY

## 2022-07-06 PROCEDURE — C1729 CATH, DRAINAGE: HCPCS | Performed by: SURGERY

## 2022-07-06 PROCEDURE — 63600175 PHARM REV CODE 636 W HCPCS: Performed by: STUDENT IN AN ORGANIZED HEALTH CARE EDUCATION/TRAINING PROGRAM

## 2022-07-06 PROCEDURE — 63600175 PHARM REV CODE 636 W HCPCS: Performed by: NURSE ANESTHETIST, CERTIFIED REGISTERED

## 2022-07-06 PROCEDURE — C1781 MESH (IMPLANTABLE): HCPCS | Performed by: SURGERY

## 2022-07-06 PROCEDURE — 36000707: Performed by: SURGERY

## 2022-07-06 PROCEDURE — 00830 ANES HERNIA RPR LWR ABD NOS: CPT | Performed by: SURGERY

## 2022-07-06 PROCEDURE — 36000706: Performed by: SURGERY

## 2022-07-06 PROCEDURE — 37000009 HC ANESTHESIA EA ADD 15 MINS: Performed by: SURGERY

## 2022-07-06 PROCEDURE — 71000015 HC POSTOP RECOV 1ST HR: Performed by: SURGERY

## 2022-07-06 PROCEDURE — 63600175 PHARM REV CODE 636 W HCPCS: Performed by: ANESTHESIOLOGY

## 2022-07-06 PROCEDURE — 71000033 HC RECOVERY, INTIAL HOUR: Performed by: SURGERY

## 2022-07-06 PROCEDURE — 25000003 PHARM REV CODE 250: Performed by: SURGERY

## 2022-07-06 PROCEDURE — 25000003 PHARM REV CODE 250: Performed by: NURSE ANESTHETIST, CERTIFIED REGISTERED

## 2022-07-06 PROCEDURE — 37000008 HC ANESTHESIA 1ST 15 MINUTES: Performed by: SURGERY

## 2022-07-06 DEVICE — BARD MESH
Type: IMPLANTABLE DEVICE | Site: GROIN | Status: FUNCTIONAL
Brand: BARD MESH

## 2022-07-06 RX ORDER — SODIUM CHLORIDE 9 MG/ML
INJECTION, SOLUTION INTRAVENOUS CONTINUOUS
Status: DISCONTINUED | OUTPATIENT
Start: 2022-07-06 | End: 2022-07-06 | Stop reason: HOSPADM

## 2022-07-06 RX ORDER — DIPHENHYDRAMINE HYDROCHLORIDE 50 MG/ML
6.25 INJECTION INTRAMUSCULAR; INTRAVENOUS ONCE AS NEEDED
Status: DISCONTINUED | OUTPATIENT
Start: 2022-07-06 | End: 2022-07-06

## 2022-07-06 RX ORDER — BUPIVACAINE HYDROCHLORIDE 2.5 MG/ML
INJECTION, SOLUTION EPIDURAL; INFILTRATION; INTRACAUDAL
Status: DISCONTINUED
Start: 2022-07-06 | End: 2022-07-06 | Stop reason: HOSPADM

## 2022-07-06 RX ORDER — HYDROMORPHONE HYDROCHLORIDE 1 MG/ML
0.2 INJECTION, SOLUTION INTRAMUSCULAR; INTRAVENOUS; SUBCUTANEOUS EVERY 5 MIN PRN
Status: DISCONTINUED | OUTPATIENT
Start: 2022-07-06 | End: 2022-07-06

## 2022-07-06 RX ORDER — HYDROCODONE BITARTRATE AND ACETAMINOPHEN 5; 325 MG/1; MG/1
1 TABLET ORAL EVERY 6 HOURS PRN
Qty: 15 TABLET | Refills: 0 | Status: SHIPPED | OUTPATIENT
Start: 2022-07-06 | End: 2023-01-23

## 2022-07-06 RX ORDER — LIDOCAINE HYDROCHLORIDE 20 MG/ML
INJECTION, SOLUTION EPIDURAL; INFILTRATION; INTRACAUDAL; PERINEURAL
Status: DISCONTINUED | OUTPATIENT
Start: 2022-07-06 | End: 2022-07-06

## 2022-07-06 RX ORDER — KETAMINE HCL IN 0.9 % NACL 50 MG/5 ML
SYRINGE (ML) INTRAVENOUS
Status: DISCONTINUED | OUTPATIENT
Start: 2022-07-06 | End: 2022-07-06

## 2022-07-06 RX ORDER — PROPOFOL 10 MG/ML
VIAL (ML) INTRAVENOUS
Status: DISCONTINUED | OUTPATIENT
Start: 2022-07-06 | End: 2022-07-06

## 2022-07-06 RX ORDER — HEPARIN SODIUM 5000 [USP'U]/ML
5000 INJECTION, SOLUTION INTRAVENOUS; SUBCUTANEOUS ONCE
Status: COMPLETED | OUTPATIENT
Start: 2022-07-06 | End: 2022-07-06

## 2022-07-06 RX ORDER — FENTANYL CITRATE 50 UG/ML
INJECTION, SOLUTION INTRAMUSCULAR; INTRAVENOUS
Status: DISCONTINUED | OUTPATIENT
Start: 2022-07-06 | End: 2022-07-06

## 2022-07-06 RX ORDER — EPHEDRINE SULFATE 50 MG/ML
INJECTION, SOLUTION INTRAVENOUS
Status: DISCONTINUED | OUTPATIENT
Start: 2022-07-06 | End: 2022-07-06

## 2022-07-06 RX ORDER — DEXAMETHASONE SODIUM PHOSPHATE 4 MG/ML
INJECTION, SOLUTION INTRA-ARTICULAR; INTRALESIONAL; INTRAMUSCULAR; INTRAVENOUS; SOFT TISSUE
Status: DISCONTINUED | OUTPATIENT
Start: 2022-07-06 | End: 2022-07-06

## 2022-07-06 RX ORDER — SODIUM CHLORIDE, SODIUM LACTATE, POTASSIUM CHLORIDE, CALCIUM CHLORIDE 600; 310; 30; 20 MG/100ML; MG/100ML; MG/100ML; MG/100ML
INJECTION, SOLUTION INTRAVENOUS CONTINUOUS
Status: DISCONTINUED | OUTPATIENT
Start: 2022-07-06 | End: 2022-07-06 | Stop reason: HOSPADM

## 2022-07-06 RX ORDER — ATROPINE SULFATE 0.4 MG/ML
INJECTION, SOLUTION ENDOTRACHEAL; INTRAMEDULLARY; INTRAMUSCULAR; INTRAVENOUS; SUBCUTANEOUS
Status: DISCONTINUED | OUTPATIENT
Start: 2022-07-06 | End: 2022-07-06

## 2022-07-06 RX ORDER — DROPERIDOL 2.5 MG/ML
0.25 INJECTION, SOLUTION INTRAMUSCULAR; INTRAVENOUS ONCE AS NEEDED
Status: DISCONTINUED | OUTPATIENT
Start: 2022-07-06 | End: 2022-07-06

## 2022-07-06 RX ORDER — METOCLOPRAMIDE HYDROCHLORIDE 5 MG/ML
10 INJECTION INTRAMUSCULAR; INTRAVENOUS ONCE AS NEEDED
Status: DISCONTINUED | OUTPATIENT
Start: 2022-07-06 | End: 2022-07-06

## 2022-07-06 RX ORDER — LIDOCAINE HYDROCHLORIDE 10 MG/ML
1 INJECTION, SOLUTION EPIDURAL; INFILTRATION; INTRACAUDAL; PERINEURAL ONCE
Status: DISCONTINUED | OUTPATIENT
Start: 2022-07-06 | End: 2022-07-06 | Stop reason: HOSPADM

## 2022-07-06 RX ORDER — ONDANSETRON 2 MG/ML
INJECTION INTRAMUSCULAR; INTRAVENOUS
Status: DISCONTINUED | OUTPATIENT
Start: 2022-07-06 | End: 2022-07-06

## 2022-07-06 RX ORDER — LIDOCAINE HYDROCHLORIDE 10 MG/ML
1 INJECTION, SOLUTION EPIDURAL; INFILTRATION; INTRACAUDAL; PERINEURAL ONCE
Status: ACTIVE | OUTPATIENT
Start: 2022-07-06

## 2022-07-06 RX ORDER — KETOROLAC TROMETHAMINE 30 MG/ML
INJECTION, SOLUTION INTRAMUSCULAR; INTRAVENOUS
Status: DISCONTINUED | OUTPATIENT
Start: 2022-07-06 | End: 2022-07-06

## 2022-07-06 RX ORDER — CEFAZOLIN SODIUM 1 G/3ML
2 INJECTION, POWDER, FOR SOLUTION INTRAMUSCULAR; INTRAVENOUS
Status: COMPLETED | OUTPATIENT
Start: 2022-07-06 | End: 2022-07-06

## 2022-07-06 RX ORDER — OXYCODONE HYDROCHLORIDE 5 MG/1
5 TABLET ORAL
Status: DISCONTINUED | OUTPATIENT
Start: 2022-07-06 | End: 2022-07-06 | Stop reason: HOSPADM

## 2022-07-06 RX ORDER — BUPIVACAINE HYDROCHLORIDE 2.5 MG/ML
INJECTION, SOLUTION EPIDURAL; INFILTRATION; INTRACAUDAL
Status: DISCONTINUED | OUTPATIENT
Start: 2022-07-06 | End: 2022-07-06 | Stop reason: HOSPADM

## 2022-07-06 RX ADMIN — SODIUM CHLORIDE, POTASSIUM CHLORIDE, SODIUM LACTATE AND CALCIUM CHLORIDE: 600; 310; 30; 20 INJECTION, SOLUTION INTRAVENOUS at 08:07

## 2022-07-06 RX ADMIN — HEPARIN SODIUM 5000 UNITS: 5000 INJECTION INTRAVENOUS; SUBCUTANEOUS at 06:07

## 2022-07-06 RX ADMIN — FENTANYL CITRATE 25 MCG: 50 INJECTION, SOLUTION INTRAMUSCULAR; INTRAVENOUS at 09:07

## 2022-07-06 RX ADMIN — ATROPINE SULFATE 0.4 MG: 0.4 INJECTION, SOLUTION INTRAMUSCULAR; INTRAVENOUS; SUBCUTANEOUS at 09:07

## 2022-07-06 RX ADMIN — FENTANYL CITRATE 50 MCG: 50 INJECTION, SOLUTION INTRAMUSCULAR; INTRAVENOUS at 09:07

## 2022-07-06 RX ADMIN — EPHEDRINE SULFATE 25 MG: 50 INJECTION INTRAVENOUS at 09:07

## 2022-07-06 RX ADMIN — LIDOCAINE HYDROCHLORIDE 70 MG: 20 INJECTION, SOLUTION EPIDURAL; INFILTRATION; INTRACAUDAL; PERINEURAL at 08:07

## 2022-07-06 RX ADMIN — PROPOFOL 150 MG: 10 INJECTION, EMULSION INTRAVENOUS at 08:07

## 2022-07-06 RX ADMIN — SODIUM CHLORIDE, POTASSIUM CHLORIDE, SODIUM LACTATE AND CALCIUM CHLORIDE: 600; 310; 30; 20 INJECTION, SOLUTION INTRAVENOUS at 07:07

## 2022-07-06 RX ADMIN — PROPOFOL 50 MG: 10 INJECTION, EMULSION INTRAVENOUS at 08:07

## 2022-07-06 RX ADMIN — DEXAMETHASONE SODIUM PHOSPHATE 8 MG: 4 INJECTION, SOLUTION INTRA-ARTICULAR; INTRALESIONAL; INTRAMUSCULAR; INTRAVENOUS; SOFT TISSUE at 08:07

## 2022-07-06 RX ADMIN — Medication 20 MG: at 09:07

## 2022-07-06 RX ADMIN — KETOROLAC TROMETHAMINE 30 MG: 30 INJECTION, SOLUTION INTRAMUSCULAR; INTRAVENOUS at 08:07

## 2022-07-06 RX ADMIN — PROPOFOL 50 MG: 10 INJECTION, EMULSION INTRAVENOUS at 09:07

## 2022-07-06 RX ADMIN — CEFAZOLIN 2 G: 330 INJECTION, POWDER, FOR SOLUTION INTRAMUSCULAR; INTRAVENOUS at 09:07

## 2022-07-06 RX ADMIN — ONDANSETRON 4 MG: 2 INJECTION INTRAMUSCULAR; INTRAVENOUS at 10:07

## 2022-07-06 RX ADMIN — Medication 30 MG: at 09:07

## 2022-07-06 NOTE — OP NOTE
INGUINAL HERNIA REPAIR OPERATIVE REPORT    Patient Name: Samir Kimble  Date of Admission: 7/6/2022  YOB: 1970  Date of procedure: 07/06/2022    Attending Surgeon: Evan Lyons MD    Resident Surgeon(s): Marla Padron, PGY-1    Pre-operative diagnosis:  1. R inguinal hernia    Post-operative diagnosis:   1. R inguinal hernia    Procedure:  1. R inguinal hernia repair    Anesthesia: general    Complications: none    Specimens: none    Blood loss: 5 cc    Indication:  Samir Kimble is a 51 y.o. male with PMH of hypertension, GERD, lap jen and spinal fushion who presented to clinical with a right inguinal hernia    Findings: Direct inguinal hernia    Procedure in detail:  The patient was brought to the operating theater and placed in a supine position.  General endotracheal anesthesia was induced.  The abdomen was prepped and draped in the usual sterile fashion.  Timeout was performed.  Perioperative antibiotics were administered.     Skin incision was made with a knife and deepened through Camper's and Leoncio's fascia with electrocautery until the aponeurosis of the external oblique was exposed. Using the knife an incision was made in the aponeurosis of the external oblique, blunt dissection was used to locate the external ring. The ilioinguinal nerve was identified and protected throughout the procedure. The spermatic cord was identified and blunt dissection was used to free it from surrounding tissue. The cord was encircled with a Penrose drain.     The direct hernia sac was identified medial to internal ring. The hernia sac was dissected away from surrounding tissue and reduced back through the abdominal wall defect. Three interrupted Prolene sutures were used to re approximate the defect in the abdominal wall. Bard soft mesh was cut into the appropriate size then interrupted Prolene suture was used to suture the mesh inferiolaterally to the inguinal ligament and superiomedially to the conjoint  tendon. The superior portion of the mesh was cut longitudinally down the center creating tails which were secured around the spermatic cord to create a new internal ring.     Penrose drain was removed. The external oblique aponeurosis and tala's fascia were closed using a running Vicryl stitch. The skin was closed using PDS in a subcuticular fashion and Dermabond was applied. All counts were correct at the end of the case. Dr. Lyons was present for the entirety of the procedure. Patient was transported to the recovery room in a stable condition.       Marla Padron MD   [facility] General Surgery PGY1

## 2022-07-06 NOTE — ANESTHESIA POSTPROCEDURE EVALUATION
Anesthesia Post Evaluation    Patient: Samir Kimble    Procedure(s) Performed: Procedure(s) (LRB):  REPAIR, HERNIA, INGUINAL (Right)    Final Anesthesia Type: general      Patient location during evaluation: PACU  Patient participation: Yes- Able to Participate  Level of consciousness: awake and alert  Post-procedure vital signs: reviewed and stable  Pain management: adequate  Airway patency: patent    PONV status at discharge: No PONV  Anesthetic complications: no      Cardiovascular status: hemodynamically stable  Respiratory status: unassisted  Hydration status: euvolemic  Follow-up not needed.          Vitals Value Taken Time   /84 07/06/22 1208   Temp 36.3 °C (97.3 °F) 07/06/22 1150   Pulse 66 07/06/22 1208   Resp 16 07/06/22 1208   SpO2 100 % 07/06/22 1208         Event Time   Out of Recovery 11:30:00         Pain/Moncho Score: Moncho Score: 10 (7/6/2022 11:50 AM)

## 2022-07-06 NOTE — ANESTHESIA PROCEDURE NOTES
Intubation    Date/Time: 7/6/2022 8:55 AM  Performed by: Vazquez Jiang CRNA  Authorized by: Jacqueline Arshad MD     Intubation:     Induction:  Intravenous    Intubated:  Postinduction    Mask Ventilation:  Easy mask    Attempts:  1    Attempted By:  CRNA    Difficult Airway Encountered?: No      Complications:  None    Airway Device:  Supraglottic airway/LMA    Airway Device Size:  4.0    Style/Cuff Inflation:  Other (see comments) (IGEL)    Secured at:  The lips    Placement Verified By:  Capnometry    Complicating Factors:  None    Findings Post-Intubation:  BS equal bilateral and atraumatic/condition of teeth unchanged

## 2022-07-06 NOTE — TRANSFER OF CARE
Anesthesia Transfer of Care Note    Patient: Samir Kibmle    Procedure(s) Performed: Procedure(s) (LRB):  REPAIR, HERNIA, INGUINAL (Right)    Patient location: PACU    Anesthesia Type: general    Transport from OR: Transported from OR on room air with adequate spontaneous ventilation    Post pain: adequate analgesia    Post assessment: no apparent anesthetic complications    Post vital signs: stable    Level of consciousness: sedated    Nausea/Vomiting: no nausea/vomiting    Complications: none    Transfer of care protocol was followed      Last vitals:   Visit Vitals  /75 (BP Location: Right arm, Patient Position: Lying)   Pulse (!) 48   Temp 36 °C (96.8 °F) (Temporal)   Resp 20   Wt 71.5 kg (157 lb 10.1 oz)   SpO2 100%   BMI 25.95 kg/m²

## 2022-07-06 NOTE — DISCHARGE SUMMARY
[facility] - Periop Services  Discharge Summary     Patient ID:  Samir Kimble  [id number]  51 y.o.  1970    Admit date: 7/6/2022    Discharge Date and Time:  07/06/2022 4:00 PM    Admitting Physician: Evan Lyons MD     Discharge Provider: Marla Padron MD    Reason for Admission: Right inguinal hernia [K40.90]  Right groin pain [R10.31]    Admission Condition: good    Procedures Performed: Procedure(s) (LRB):  REPAIR, HERNIA, INGUINAL (Right)    Hospital Course Pt was brought in for surgery on 7/6/22. Pt underwent right inguinal hernia repair with mesh. Patient was transported to the post-op floor. Pt had trouble urinating after surgery, a bladder scan was done and pt was retaining 820cc of urine. Pt was straight cathed and was d/c after return of urinary function.      Final Diagnoses:    Principal Problem: Inguinal hernia     Discharged Condition: good    Discharge Exam:  General: NAD, pt sitting comfortably in bed  HEENT: NCAT  Cardio: RRR  Resp: no increased WOB, satting well on RA  Abd: soft, non-tender, non-distended, Incision in R groin clean, dry, non-erythematous      Disposition: Home or Self Care    Follow Up/Patient Instructions:     Medications:  Reconciled Home Medications:      Medication List      START taking these medications    HYDROcodone-acetaminophen 5-325 mg per tablet  Commonly known as: NORCO  Take 1 tablet by mouth every 6 (six) hours as needed for Pain.        ASK your doctor about these medications    omeprazole 20 MG capsule  Commonly known as: PRILOSEC  Take 20 mg by mouth once daily.          Discharge Procedure Orders   Diet Adult Regular     Lifting restrictions   Order Comments: Do not lift more than 20 pounds for 2 weeks     Notify your health care provider if you experience any of the following:  temperature >100.4     Notify your health care provider if you experience any of the following:  persistent nausea and vomiting or diarrhea     Notify your health care  provider if you experience any of the following:  severe uncontrolled pain     Notify your health care provider if you experience any of the following:  redness, tenderness, or signs of infection (pain, swelling, redness, odor or green/yellow discharge around incision site)     No dressing needed   Order Comments: You can shower in two days. Do not submerged the incision in water for two weeks. Use an ice pack over the incision to help with swelling and pain. Make sure to wear supportive underwear, such as briefs.       Activity: no heavy lifting for 6 weeks  Diet: regular diet  Wound Care: keep wound clean and dry    Follow-up with 2 in weeks    Signed:  Marla Padron MD  7/6/2022  3:43 PM

## 2022-07-06 NOTE — DISCHARGE INSTRUCTIONS
*Keep follow up appointment in 2 weeks at the central clinic.  *Take pain medication as prescribed.  * Ice pack to groin will help with pain and swelling.  No driving or consuming alcohol for the next 24 hours or while taking narcotic pain medicine.  *No heavy lifting greater than 10-20 pounds.   *Please wear briefs for support of hernia surgical site and testicles.  *** Notify MD of any moderate to severe pain unrelieved by pain medicine or for any signs of infection including fever above 100.4, excessive redness or swelling, yellow/green foul smelling drainage, nausea or vomiting. Clinics number is [phone]. If it is after business hours, call [phone] and ask to have surgeon on call to contact you. Thanks for choosing [facility]! Have a smooth recovery!

## 2022-07-06 NOTE — HOSPITAL COURSE
Pt was brought in for surgery on 7/6/22. Pt underwent right inguinal hernia repair with mesh. Patient was transported to the post-op floor. Pt had trouble urinating after surgery, a bladder scan was done and pt was retaining 820cc of urine. Pt was straight cathed and was d/c after return of urinary function.

## 2022-07-07 VITALS
WEIGHT: 157.63 LBS | SYSTOLIC BLOOD PRESSURE: 122 MMHG | TEMPERATURE: 98 F | DIASTOLIC BLOOD PRESSURE: 81 MMHG | OXYGEN SATURATION: 100 % | RESPIRATION RATE: 18 BRPM | BODY MASS INDEX: 25.95 KG/M2 | HEART RATE: 60 BPM

## 2022-07-14 ENCOUNTER — OFFICE VISIT (OUTPATIENT)
Dept: INTERNAL MEDICINE | Facility: CLINIC | Age: 52
End: 2022-07-14

## 2022-07-14 VITALS
HEIGHT: 65 IN | WEIGHT: 162.69 LBS | SYSTOLIC BLOOD PRESSURE: 118 MMHG | TEMPERATURE: 98 F | DIASTOLIC BLOOD PRESSURE: 80 MMHG | BODY MASS INDEX: 27.1 KG/M2 | RESPIRATION RATE: 20 BRPM | HEART RATE: 67 BPM

## 2022-07-14 DIAGNOSIS — K76.0 HEPATIC STEATOSIS: Primary | ICD-10-CM

## 2022-07-14 DIAGNOSIS — Z12.5 PROSTATE CANCER SCREENING: ICD-10-CM

## 2022-07-14 DIAGNOSIS — K29.70 GASTRITIS, PRESENCE OF BLEEDING UNSPECIFIED, UNSPECIFIED CHRONICITY, UNSPECIFIED GASTRITIS TYPE: ICD-10-CM

## 2022-07-14 DIAGNOSIS — I10 HYPERTENSION, UNSPECIFIED TYPE: ICD-10-CM

## 2022-07-14 NOTE — PROGRESS NOTES
[facility] Internal Medicine Resident Clinic    Subjective:      51-year-old caucasion male with the past medical history of hypertension, hepatic steatosis, gastritis and right inguinal hernia repair presents to [facility] for follow-up.  Last Wednesday patient had right inguinal hernia repair.  Patient states that anesthesia told him that he had congestive heart failure.  Overall patient states that he is feeling well.  Patient had a colonoscopy on 04/11/2022 which was filled 7 adenomatous polyps in the transverse colon and a adenomatous polyp in the sigmoid colon.  Patient is to have colonoscopy in 2025.  Patient had EGD in 2018 which revealed gastritis.  Patient denies fever, chills, lightheadedness, dizziness, chest pain, shortness a breath, abdominal pain, nausea, vomiting, dysuria, hematuria, hematochezia, diarrhea, constipation or peripheral edema.    Review of Systems:  10 point ROS negative except for HPI    Objective:   Vital Signs:  Vitals:    07/14/22 1221   BP: 118/80   Pulse: 67   Resp: 20   Temp: 98.4 °F (36.9 °C)        .FLOWAMB[14     Body mass index is 26.78 kg/m².     General:  Well developed, well nourished, no acute respiratory distress  Head: Normocephalic, atraumatic  Eyes: PERRL, EOMI, anicteric sclera  Throat: No posterior pharyngeal erythema or exudate, no tonsillar exudate  Neck: supple, normal ROM, no thyromegaly   CVS:  RRR, S1 and S2 normal, no murmurs, no added heart sounds, rubs, gallops, 2+ peripheral pulses  Resp:  Lungs clear to auscultation bilaterally, no wheezes, rales, or rhonci  GI:  Abdomen soft, non-tender, non-distended, normoactive bowel sounds  MSK:  No muscle atrophy, cyanosis, peripheral edema, full range of motion  Skin:  No rashes, ulcers, erythema  Neuro:  Alert and oriented x3, No focal neuro deficits, CNII-XII grossly intact  Psych:  Appropriate mood and affect         Laboratory:  Lab Results   Component Value Date    WBC 9.0 02/27/2022    HGB 15.2 02/27/2022    HCT  44.1 02/27/2022     02/27/2022    MCV 81.1 02/27/2022    RDW 13.6 02/27/2022    Lab Results   Component Value Date     02/27/2022    K 3.6 02/27/2022    CO2 24 02/27/2022    BUN 18.6 02/27/2022    CREATININE 1.28 02/27/2022    CALCIUM 9.4 02/27/2022      Lab Results   Component Value Date    HGBA1C 5.8 05/15/2020     (H) 05/15/2020    CREATININE 1.28 02/27/2022    Lab Results   Component Value Date    TSH 0.857 05/15/2020                .labDM:   Lab Results   Component Value Date    HGBA1C 5.8 05/15/2020     (H) 05/15/2020    CREATININE 1.28 02/27/2022     Thyroid:   Lab Results   Component Value Date    TSH 0.857 05/15/2020     Anemia: No results found for: IRON, TIBC, FERRITIN, [id number], FOLATE    Current Medications:  Current Outpatient Medications   Medication Instructions    HYDROcodone-acetaminophen (NORCO) 5-325 mg per tablet 1 tablet, Oral, Every 6 hours PRN    omeprazole (PRILOSEC) 20 mg, Oral, Daily        Assessment and Plan:      HTN  - 118/80  - stable not on medication      Hepatic Steatosis  - abdominal US 2/11/2022 hepatic steatosis with early changes from cirrhosis cannot be excluded  - repeat abdominal US is in 1 month.  - will order anti smooth muscle antibody as patient as a family history of autoimmune hepatitis  - patient has a history of drinking 3-4 beers a day for 10 years. He has stopped drinking alcohol in the past 8 months  - Well see patient again in 6 months        Right inguinal hernia repair  - 7/06/2022 right inguinal hernia repaired  - incision is healing well and intact      Hx Gastritis  - EGD performed in 2018  - omeprazole 20 mg    Health maintenance    4/11/2022 colonoscopy    Transverse colon polyp x7 s/p  Polypectomy:  - colonoscopy 4/11/2022  - Adenomatous polyps.  - No evidence of malignancy or high grade dysplasia.  - repeat colonoscopy in 2025 per GI     Sigmoid colon polyp s/p Polypectomy:  - colonoscopy 4/11/2022  - Adenomatous polyp.  -  No evidence of malignancy or high grade dysplasia.  - repeat colonoscopy in 2025 per GI      PSA ordered this visit  Zoster uptodate  Tdap uptodate  Hep c non reactive 2/22/2022      Health Maintenance   Topic Date Due    Hepatitis C Screening  Never done    TETANUS VACCINE  Never done    Lipid Panel  05/15/2025                  Gee Gilman MD

## 2022-07-19 ENCOUNTER — OFFICE VISIT (OUTPATIENT)
Dept: SURGERY | Facility: CLINIC | Age: 52
End: 2022-07-19

## 2022-07-19 VITALS
HEART RATE: 58 BPM | WEIGHT: 158.38 LBS | OXYGEN SATURATION: 97 % | HEIGHT: 66 IN | TEMPERATURE: 98 F | SYSTOLIC BLOOD PRESSURE: 131 MMHG | RESPIRATION RATE: 20 BRPM | BODY MASS INDEX: 25.45 KG/M2 | DIASTOLIC BLOOD PRESSURE: 85 MMHG

## 2022-07-19 DIAGNOSIS — Z87.19 S/P INGUINAL HERNIA REPAIR: Primary | ICD-10-CM

## 2022-07-19 DIAGNOSIS — Z98.890 S/P INGUINAL HERNIA REPAIR: Primary | ICD-10-CM

## 2022-07-19 PROCEDURE — 99213 OFFICE O/P EST LOW 20 MIN: CPT | Mod: PBBFAC

## 2022-07-19 NOTE — PROGRESS NOTES
I have reviewed the notes, assessments, and/or procedures performed by the resident, I concur with her/his documentation of Samir Kimble.     Azra Nicholson MD

## 2022-07-19 NOTE — PROGRESS NOTES
"Surgery Clinic Note     CC: s/p inguinal hernia repair 7/6/22    HPI:  51-year-old M s/p inguinal hernia repair 7/6/22. Denies pain, redness and drainage from incision site. Surgical site is healing well and he has not noticed any new bulges. He is tolerating a regular diet and having normal bowel function. Denies f/c/n/v/CP/SOB.    PMH: HTN, GERD, back pain, s/p lap jen    PSH:   Past Surgical History:   Procedure Laterality Date    BACK SURGERY      BACK SURGERY      CHOLECYSTECTOMY      HERNIA REPAIR         ROS: Negative except above     Current Outpatient Medications on File Prior to Visit   Medication Sig Dispense Refill    HYDROcodone-acetaminophen (NORCO) 5-325 mg per tablet Take 1 tablet by mouth every 6 (six) hours as needed for Pain. (Patient not taking: No sig reported) 15 tablet 0    omeprazole (PRILOSEC) 20 MG capsule Take 20 mg by mouth once daily.       Current Facility-Administered Medications on File Prior to Visit   Medication Dose Route Frequency Provider Last Rate Last Admin    LIDOcaine (PF) 10 mg/ml (1%) injection 10 mg  1 mL Intradermal Once Yaritza Suarez, SARMAD           Physical Exam  /85 (BP Location: Right arm, Patient Position: Sitting, BP Method: Medium (Automatic))   Pulse (!) 58   Temp 97.9 °F (36.6 °C) (Oral)   Resp 20   Ht 5' 6" (1.676 m)   Wt 71.8 kg (158 lb 6.4 oz)   SpO2 97%   BMI 25.57 kg/m²   General: NAD, AAO X 3  CV: RRR  Resp: Clear to ascultation bilaterally  Abdomen: soft, non-tender, non-distended, bowel sounds present  Skin: Right groin incision is clean, dry and intact, no erythema or drainage      ASSESSMENT/PLAN  51-year-old M s/p inguinal hernia repair 7/6/22.    -Doing well post-op  -RTC prn    Michaela Curtis, MS3    Patient seen and examined with Student Doctor Ever. Agree with assessment and plan as documented. Above note reviewed and edited as necessary.    Belinda Edmonds MD  [employer] General Surgery, PGY-3    "

## 2022-07-19 NOTE — PROGRESS NOTES
Patient seen by DR. ITALO Edmonds. Will return prn. Written and verbal discharge instructions given.

## 2022-07-26 ENCOUNTER — HOSPITAL ENCOUNTER (EMERGENCY)
Facility: HOSPITAL | Age: 52
Discharge: HOME | End: 2022-07-26
Attending: INTERNAL MEDICINE
Payer: MEDICAID

## 2022-07-26 VITALS
TEMPERATURE: 98 F | SYSTOLIC BLOOD PRESSURE: 119 MMHG | DIASTOLIC BLOOD PRESSURE: 82 MMHG | RESPIRATION RATE: 16 BRPM | BODY MASS INDEX: 25.86 KG/M2 | HEART RATE: 58 BPM | HEIGHT: 66 IN | OXYGEN SATURATION: 100 % | WEIGHT: 160.94 LBS

## 2022-07-26 DIAGNOSIS — Z87.19 HISTORY OF INGUINAL HERNIA REPAIR: ICD-10-CM

## 2022-07-26 DIAGNOSIS — Z98.890 HISTORY OF INGUINAL HERNIA REPAIR: ICD-10-CM

## 2022-07-26 DIAGNOSIS — R30.0 DYSURIA: Primary | ICD-10-CM

## 2022-07-26 LAB
APPEARANCE UR: CLEAR
BACTERIA #/AREA URNS AUTO: NORMAL /HPF
BILIRUB UR QL STRIP.AUTO: NEGATIVE MG/DL
COLOR UR AUTO: COLORLESS
GLUCOSE UR QL STRIP.AUTO: NORMAL MG/DL
HYALINE CASTS #/AREA URNS LPF: NORMAL /LPF
KETONES UR QL STRIP.AUTO: NEGATIVE MG/DL
LEUKOCYTE ESTERASE UR QL STRIP.AUTO: NEGATIVE UNIT/L
NITRITE UR QL STRIP.AUTO: NEGATIVE
PH UR STRIP.AUTO: 7 [PH]
PROT UR QL STRIP.AUTO: NEGATIVE MG/DL
RBC #/AREA URNS AUTO: NORMAL /HPF
RBC UR QL AUTO: NEGATIVE UNIT/L
SP GR UR STRIP.AUTO: 1
SQUAMOUS #/AREA URNS LPF: NORMAL /HPF
UROBILINOGEN UR STRIP-ACNC: NORMAL MG/DL
WBC #/AREA URNS AUTO: NORMAL /HPF

## 2022-07-26 PROCEDURE — 81001 URINALYSIS AUTO W/SCOPE: CPT | Performed by: PHYSICIAN ASSISTANT

## 2022-07-26 PROCEDURE — 99283 EMERGENCY DEPT VISIT LOW MDM: CPT | Mod: 25

## 2022-07-26 NOTE — Clinical Note
"Samir Kaur" Shyanne was seen and treated in our emergency department on 7/26/2022.  He may return to work on 07/27/2022.       If you have any questions or concerns, please don't hesitate to call.       RN    "

## 2022-07-26 NOTE — ED PROVIDER NOTES
Encounter Date: 7/26/2022       History     Chief Complaint   Patient presents with    Dysuria     PT W CO INTERMITTENT DYSURIA X 2 WKS.  DENIES URETHRAL DC. RECENT HERNIA SURG. 3 WKS AGO. DENIES PAIN OR REDNESS TO SURG. SITE.  NO FEVER.       50 yo M w/ PMHx significant for recent R inguinal hernia repair on 7/6/22 presents to ED c/o mild dysuria. Patient reports dysuria began approx 3 days after surgery & second episode of urination produced the worst pain, but has been getting progressively better since then. States he brought this up at post-op appointment & was told it was normal, but to follow-up if symptoms persisted more than a week. Patient reports mild testicular pain, but otherwise denies all complaints & states surgical incision site healing well. Denies hematuria, penile discharge, scrotal swelling, incontinence, urinary retention, change in chronic back pain, change in bowel habits, blood in stool, F/C. VSS on arrival w/ NAD.        Review of patient's allergies indicates:  No Known Allergies  Past Medical History:   Diagnosis Date    Back pain with history of spinal surgery     Hypertension      Past Surgical History:   Procedure Laterality Date    BACK SURGERY      BACK SURGERY      CHOLECYSTECTOMY      HERNIA REPAIR       No family history on file.  Social History     Tobacco Use    Smoking status: Never Smoker    Smokeless tobacco: Never Used   Substance Use Topics    Alcohol use: Not Currently    Drug use: Never     Review of Systems   Constitutional: Negative for chills, fatigue and fever.   Eyes: Negative for discharge and redness.   Respiratory: Negative for shortness of breath.    Cardiovascular: Negative for chest pain.   Gastrointestinal: Negative for abdominal distention, abdominal pain, blood in stool, diarrhea, nausea and vomiting.   Endocrine: Negative for polydipsia, polyphagia and polyuria.   Genitourinary: Positive for dysuria and testicular pain. Negative for decreased  urine volume, difficulty urinating, enuresis, flank pain, frequency, genital sores, hematuria, penile discharge, penile pain, penile swelling, scrotal swelling and urgency.   Musculoskeletal: Negative for arthralgias, back pain and myalgias.   Skin: Negative for pallor and rash.   Allergic/Immunologic: Negative for immunocompromised state.   Neurological: Negative for dizziness, syncope and headaches.   Hematological: Negative for adenopathy.   All other systems reviewed and are negative.      Physical Exam     Initial Vitals [07/26/22 0729]   BP Pulse Resp Temp SpO2   119/82 (!) 58 16 97.9 °F (36.6 °C) 100 %      MAP       --         Physical Exam    Nursing note and vitals reviewed.  Constitutional: He appears well-developed and well-nourished. He is not diaphoretic. No distress.   HENT:   Head: Normocephalic and atraumatic.   Eyes: Conjunctivae are normal. Pupils are equal, round, and reactive to light.   Neck: Neck supple.   Normal range of motion.  Cardiovascular: Normal rate, regular rhythm, normal heart sounds and intact distal pulses. Exam reveals no gallop and no friction rub.    No murmur heard.  Pulmonary/Chest: Breath sounds normal. No respiratory distress. He has no wheezes. He has no rhonchi. He has no rales.   Abdominal: Abdomen is soft. Bowel sounds are normal. He exhibits no distension. There is no abdominal tenderness. There is no rebound and no guarding.   Musculoskeletal:         General: Normal range of motion.      Cervical back: Normal range of motion and neck supple.     Neurological: He is alert and oriented to person, place, and time. No cranial nerve deficit or sensory deficit.   Skin: Skin is warm and dry. Capillary refill takes less than 2 seconds. No erythema. No pallor.   Psychiatric: He has a normal mood and affect.         ED Course   Procedures  Labs Reviewed   URINALYSIS          Imaging Results    None          Medications - No data to display  Medical Decision Making:   Clinical  Tests:   Lab Tests: Ordered and Reviewed  UA wholly unremarkable w/ no evidence of infection or other abnormality. Physical exam unremarkable w/ no significant post-op abnormalities. Spoke w/ surgery on-call, as surgery was performed w/in last 30 days, & they recommended patient keep all scheduled follow-ups. Plan of care discussed w/ patient. ED precautions given.                      Clinical Impression:   Final diagnoses:  [R30.0] Dysuria (Primary)  [Z98.890, Z87.19] History of inguinal hernia repair          ED Disposition Condition    Discharge Stable        ED Prescriptions     None        Follow-up Information     Follow up With Specialties Details Why Contact Info    Gee Gilman MD Internal Medicine In 1 week  [address] [facility] [address]  [phone]      [facility] General Surgery  As needed [address] [facility] [id number] [phone]    [facility] - Emergency Dept Emergency Medicine  If symptoms worsen [address] [facility] [id number] [phone]           LUCIANO Pickett  07/26/22 0837

## 2022-07-26 NOTE — DISCHARGE INSTRUCTIONS
Report to Emergency Department if symptoms return or worsen; [facility] - Medicine Clinic Within 1 to 2 days, It is important that you follow up with your primary care provider or specialist if indicated for further evaluation, workup, and treatment as necessary. The exam and treatment you received in Emergency Department was for an urgent problem and NOT INTENDED AS COMPLETE CARE. It is important that you FOLLOW UP with a doctor for ongoing care. If your symptoms become WORSE or you DO NOT IMPROVE and you are unable to reach your health care provider, you should RETURN to the Emergency Department. The Emergency Department provider has provided a PRELIMINARY INTERPRETATION of all your studies. A final interpretation may be done after you are discharged. If a change in your diagnosis or treatment is needed WE WILL CONTACT YOU. It is critical that we have a CURRENT PHONE NUMBER FOR YOU.

## 2022-08-05 ENCOUNTER — LAB VISIT (OUTPATIENT)
Dept: LAB | Facility: HOSPITAL | Age: 52
End: 2022-08-05
Payer: MEDICAID

## 2022-08-05 DIAGNOSIS — Z12.5 PROSTATE CANCER SCREENING: ICD-10-CM

## 2022-08-05 DIAGNOSIS — K76.0 HEPATIC STEATOSIS: ICD-10-CM

## 2022-08-05 LAB — PSA SERPL-MCNC: 1.63 NG/ML

## 2022-08-05 PROCEDURE — 84153 ASSAY OF PSA TOTAL: CPT

## 2022-08-05 PROCEDURE — 86255 FLUORESCENT ANTIBODY SCREEN: CPT

## 2022-08-05 PROCEDURE — 36415 COLL VENOUS BLD VENIPUNCTURE: CPT

## 2022-08-06 LAB — SMOOTH MUSCLE AB SER QL IF: NEGATIVE

## 2022-08-11 ENCOUNTER — TELEPHONE (OUTPATIENT)
Dept: INTERNAL MEDICINE | Facility: CLINIC | Age: 52
End: 2022-08-11

## 2022-08-11 NOTE — TELEPHONE ENCOUNTER
Called patient to inform of normal test results per Dr Gilman, patient verbalized understanding and pleased

## 2022-08-11 NOTE — TELEPHONE ENCOUNTER
----- Message from Gee Gilman MD sent at 8/11/2022 11:04 AM CDT -----  Lab test for autoimmune hepatitis was negative

## 2022-08-17 ENCOUNTER — HOSPITAL ENCOUNTER (OUTPATIENT)
Dept: RADIOLOGY | Facility: HOSPITAL | Age: 52
Discharge: HOME | End: 2022-08-17

## 2022-08-17 DIAGNOSIS — K76.0 HEPATIC STEATOSIS: ICD-10-CM

## 2022-08-17 PROCEDURE — 76705 ECHO EXAM OF ABDOMEN: CPT | Mod: TC

## 2022-09-06 ENCOUNTER — TELEPHONE (OUTPATIENT)
Dept: INTERNAL MEDICINE | Facility: CLINIC | Age: 52
End: 2022-09-06

## 2022-09-06 NOTE — TELEPHONE ENCOUNTER
Pt wife stated pt had an US done 3 weeks ago and they still have not heard any results. Pt is requesting a call back with these results.

## 2023-01-23 ENCOUNTER — OFFICE VISIT (OUTPATIENT)
Dept: INTERNAL MEDICINE | Facility: CLINIC | Age: 53
End: 2023-01-23
Payer: MEDICAID

## 2023-01-23 VITALS
OXYGEN SATURATION: 100 % | WEIGHT: 162 LBS | RESPIRATION RATE: 18 BRPM | HEART RATE: 55 BPM | BODY MASS INDEX: 26.03 KG/M2 | HEIGHT: 66 IN | SYSTOLIC BLOOD PRESSURE: 129 MMHG | DIASTOLIC BLOOD PRESSURE: 86 MMHG | TEMPERATURE: 98 F

## 2023-01-23 DIAGNOSIS — K29.70 GASTRITIS, PRESENCE OF BLEEDING UNSPECIFIED, UNSPECIFIED CHRONICITY, UNSPECIFIED GASTRITIS TYPE: ICD-10-CM

## 2023-01-23 DIAGNOSIS — I10 HYPERTENSION, UNSPECIFIED TYPE: Primary | ICD-10-CM

## 2023-01-23 DIAGNOSIS — M25.511 BILATERAL SHOULDER PAIN, UNSPECIFIED CHRONICITY: ICD-10-CM

## 2023-01-23 DIAGNOSIS — M25.512 BILATERAL SHOULDER PAIN, UNSPECIFIED CHRONICITY: ICD-10-CM

## 2023-01-23 DIAGNOSIS — K76.0 HEPATIC STEATOSIS: ICD-10-CM

## 2023-01-23 PROCEDURE — 99214 OFFICE O/P EST MOD 30 MIN: CPT | Mod: PBBFAC

## 2023-01-23 NOTE — PROGRESS NOTES
[facility] Internal Medicine Resident Clinic    Subjective:        52-year-old caucasion male with the past medical history of hypertension, hepatic steatosis, gastritis and right inguinal hernia repair presents to [facility] IM clinic for follow-up. Patient states that he is doing well but admits that he does have bilateral shoulder pain that comes and goes which gets better with ice/heating pads and rest. I advised patient if it gets worse to contact me.      Patient had a colonoscopy on 04/11/2022 which was filled 7 adenomatous polyps in the transverse colon and a adenomatous polyp in the sigmoid colon.  Patient is to have colonoscopy in 2025.  Patient had EGD in 2018 which revealed gastritis.  Patient denies fever, chills, lightheadedness, dizziness, chest pain, shortness a breath, abdominal pain, nausea, vomiting, dysuria, hematuria, hematochezia, diarrhea, constipation or peripheral edema.    Review of Systems:  10 point ROS negative except for HPI    Objective:   Vital Signs:  Vitals:    01/23/23 0827   BP: 129/86   Pulse: (!) 55   Resp: 18   Temp: 97.5 °F (36.4 °C)        Body mass index is 26.15 kg/m².     General:  Well developed, well nourished, no acute respiratory distress  Head: Normocephalic, atraumatic  Eyes: PERRL, EOMI, anicteric sclera  Throat: No posterior pharyngeal erythema or exudate, no tonsillar exudate  Neck: supple, normal ROM, no thyromegaly   CVS:  RRR, S1 and S2 normal, no murmurs, no added heart sounds, rubs, gallops, 2+ peripheral pulses  Resp:  Lungs clear to auscultation bilaterally, no wheezes, rales, or rhonci  GI:  Abdomen soft, non-tender, non-distended, normoactive bowel sounds  MSK:  No muscle atrophy, cyanosis, peripheral edema, full range of motion  Skin:  No rashes, ulcers, erythema  Neuro:  Alert and oriented x3, No focal neuro deficits, CNII-XII grossly intact  Psych:  Appropriate mood and affect         Laboratory:  Lab Results   Component Value Date    WBC 9.0 02/27/2022    HGB 15.2  02/27/2022    HCT 44.1 02/27/2022     02/27/2022    MCV 81.1 02/27/2022    RDW 13.6 02/27/2022    Lab Results   Component Value Date     02/27/2022    K 3.6 02/27/2022    CO2 24 02/27/2022    BUN 18.6 02/27/2022    CREATININE 1.28 02/27/2022    CALCIUM 9.4 02/27/2022      Lab Results   Component Value Date    HGBA1C 5.8 05/15/2020     (H) 05/15/2020    CREATININE 1.28 02/27/2022    Lab Results   Component Value Date    TSH 0.857 05/15/2020                .labDM:   Lab Results   Component Value Date    HGBA1C 5.8 05/15/2020     (H) 05/15/2020    CREATININE 1.28 02/27/2022     Thyroid:   Lab Results   Component Value Date    TSH 0.857 05/15/2020     Anemia: No results found for: IRON, TIBC, FERRITIN, [id number], FOLATE    Current Medications:  Current Outpatient Medications   Medication Instructions    HYDROcodone-acetaminophen (NORCO) 5-325 mg per tablet 1 tablet, Oral, Every 6 hours PRN    omeprazole (PRILOSEC) 20 mg, Oral, Daily        Assessment and Plan:     Bilateral Shoulder pain  - ice/heating pads and rest improves occasional pain. Patient has full range of motion  - patient does not want imaging at this time.   - advised patient if it gets worse then to get in contact with the office.      HTN  - 129/87  - stable not on medication  - patient walks 5-6 miles daily and has decreased his alcohol intake  - eats a balanced diet of fruits and vegetables.        Hepatic Steatosis  - abdominal US 2/11/2022 hepatic steatosis with early changes from cirrhosis cannot be excluded  - repeat abdominal US still reveals hepatic steatosis but improved from previous US  - patient is no longer a heavy drinker and walks 5-6 miles daily        Right inguinal hernia repair  - 7/06/2022 right inguinal hernia repaired  - incision is healing well and intact     Hx Gastritis  - EGD performed in 2018  - omeprazole 20 mg     Health maintenance      Transverse colon polyp x7 s/p  Polypectomy:  - colonoscopy  4/11/2022  - Adenomatous polyps.  - No evidence of malignancy or high grade dysplasia.  - repeat colonoscopy in 2025 per GI      Sigmoid colon polyp s/p Polypectomy:  - colonoscopy 4/11/2022  - Adenomatous polyp.  - No evidence of malignancy or high grade dysplasia.  - repeat colonoscopy in 2025 per GI       PSA - 1.63 on 8/5/2022  Zoster- 6/24/2021, 1/25/2022  Tdap - 6/15/2018  Hep c non reactive 2/22/2022  Health Maintenance   Topic Date Due    Lipid Panel  05/15/2025    TETANUS VACCINE  06/05/2028    Hepatitis C Screening  Completed                  Gee Gilman MD

## 2023-06-21 ENCOUNTER — PATIENT MESSAGE (OUTPATIENT)
Dept: ADMINISTRATIVE | Facility: HOSPITAL | Age: 53
End: 2023-06-21
Payer: MEDICAID

## 2024-01-16 ENCOUNTER — LAB VISIT (OUTPATIENT)
Dept: LAB | Facility: HOSPITAL | Age: 54
End: 2024-01-16

## 2024-01-16 DIAGNOSIS — I10 HYPERTENSION, UNSPECIFIED TYPE: ICD-10-CM

## 2024-01-16 LAB
ALBUMIN SERPL-MCNC: 4.2 G/DL (ref 3.5–5)
ALBUMIN/GLOB SERPL: 1.3 RATIO (ref 1.1–2)
ALP SERPL-CCNC: 61 UNIT/L (ref 40–150)
ALT SERPL-CCNC: 16 UNIT/L (ref 0–55)
APPEARANCE UR: CLEAR
AST SERPL-CCNC: 18 UNIT/L (ref 5–34)
BACTERIA #/AREA URNS AUTO: ABNORMAL /HPF
BILIRUB SERPL-MCNC: 0.6 MG/DL
BILIRUB UR QL STRIP.AUTO: NEGATIVE
BUN SERPL-MCNC: 15.5 MG/DL (ref 8.4–25.7)
CALCIUM SERPL-MCNC: 9.5 MG/DL (ref 8.4–10.2)
CHLORIDE SERPL-SCNC: 107 MMOL/L (ref 98–107)
CO2 SERPL-SCNC: 27 MMOL/L (ref 22–29)
COLOR UR AUTO: COLORLESS
CREAT SERPL-MCNC: 0.98 MG/DL (ref 0.73–1.18)
CREAT UR-MCNC: 11.7 MG/DL (ref 63–166)
CREAT UR-MCNC: 11.9 MG/DL (ref 63–166)
GFR SERPLBLD CREATININE-BSD FMLA CKD-EPI: >60 MLS/MIN/1.73/M2
GLOBULIN SER-MCNC: 3.3 GM/DL (ref 2.4–3.5)
GLUCOSE SERPL-MCNC: 100 MG/DL (ref 74–100)
GLUCOSE UR QL STRIP.AUTO: NORMAL
HYALINE CASTS #/AREA URNS LPF: ABNORMAL /LPF
KETONES UR QL STRIP.AUTO: NEGATIVE
LEUKOCYTE ESTERASE UR QL STRIP.AUTO: NEGATIVE
MICROALBUMIN UR-MCNC: <5 UG/ML
MICROALBUMIN/CREAT RATIO PNL UR: ABNORMAL
NITRITE UR QL STRIP.AUTO: NEGATIVE
PH UR STRIP.AUTO: 6 [PH]
POTASSIUM SERPL-SCNC: 4.1 MMOL/L (ref 3.5–5.1)
PROT SERPL-MCNC: 7.5 GM/DL (ref 6.4–8.3)
PROT UR QL STRIP.AUTO: NEGATIVE
PROT UR STRIP-MCNC: <6.8 MG/DL
RBC #/AREA URNS AUTO: ABNORMAL /HPF
RBC UR QL AUTO: NEGATIVE
SODIUM SERPL-SCNC: 141 MMOL/L (ref 136–145)
SP GR UR STRIP.AUTO: ABNORMAL
SQUAMOUS #/AREA URNS LPF: ABNORMAL /HPF
T3FREE SERPL-MCNC: 3.31 PG/ML (ref 1.58–3.91)
T4 FREE SERPL-MCNC: 0.95 NG/DL (ref 0.7–1.48)
TSH SERPL-ACNC: 1.93 UIU/ML (ref 0.35–4.94)
UROBILINOGEN UR STRIP-ACNC: NORMAL
WBC #/AREA URNS AUTO: ABNORMAL /HPF

## 2024-01-16 PROCEDURE — 81001 URINALYSIS AUTO W/SCOPE: CPT

## 2024-01-16 PROCEDURE — 84443 ASSAY THYROID STIM HORMONE: CPT

## 2024-01-16 PROCEDURE — 82570 ASSAY OF URINE CREATININE: CPT

## 2024-01-16 PROCEDURE — 84481 FREE ASSAY (FT-3): CPT

## 2024-01-16 PROCEDURE — 36415 COLL VENOUS BLD VENIPUNCTURE: CPT

## 2024-01-16 PROCEDURE — 80053 COMPREHEN METABOLIC PANEL: CPT

## 2024-01-16 PROCEDURE — 84439 ASSAY OF FREE THYROXINE: CPT

## 2024-01-16 PROCEDURE — 82043 UR ALBUMIN QUANTITATIVE: CPT

## 2024-01-22 ENCOUNTER — HOSPITAL ENCOUNTER (OUTPATIENT)
Dept: RADIOLOGY | Facility: HOSPITAL | Age: 54
Discharge: HOME | End: 2024-01-22

## 2024-01-22 ENCOUNTER — OFFICE VISIT (OUTPATIENT)
Dept: INTERNAL MEDICINE | Facility: CLINIC | Age: 54
End: 2024-01-22

## 2024-01-22 VITALS
TEMPERATURE: 98 F | HEART RATE: 60 BPM | BODY MASS INDEX: 26.33 KG/M2 | WEIGHT: 163.81 LBS | RESPIRATION RATE: 18 BRPM | HEIGHT: 66 IN | OXYGEN SATURATION: 100 % | DIASTOLIC BLOOD PRESSURE: 70 MMHG | SYSTOLIC BLOOD PRESSURE: 116 MMHG

## 2024-01-22 DIAGNOSIS — M54.10 RADICULOPATHY AFFECTING UPPER EXTREMITY: ICD-10-CM

## 2024-01-22 DIAGNOSIS — I10 HYPERTENSION, UNSPECIFIED TYPE: Primary | ICD-10-CM

## 2024-01-22 DIAGNOSIS — Z00.00 WELLNESS EXAMINATION: ICD-10-CM

## 2024-01-22 LAB — HBA1C MFR BLD: 5.3 %

## 2024-01-22 PROCEDURE — 72040 X-RAY EXAM NECK SPINE 2-3 VW: CPT | Mod: TC

## 2024-01-22 PROCEDURE — 99215 OFFICE O/P EST HI 40 MIN: CPT | Mod: PBBFAC,25

## 2024-01-22 PROCEDURE — 83036 HEMOGLOBIN GLYCOSYLATED A1C: CPT | Mod: PBBFAC

## 2024-01-22 PROCEDURE — 73030 X-RAY EXAM OF SHOULDER: CPT | Mod: TC,LT

## 2024-01-22 PROCEDURE — 73030 X-RAY EXAM OF SHOULDER: CPT | Mod: TC,RT

## 2024-01-22 RX ORDER — GABAPENTIN 100 MG/1
100 CAPSULE ORAL 3 TIMES DAILY PRN
Qty: 90 CAPSULE | Refills: 1 | Status: SHIPPED | OUTPATIENT
Start: 2024-01-22 | End: 2025-01-21

## 2024-01-22 NOTE — PROGRESS NOTES
[facility] Internal Medicine Resident Clinic    Subjective:        52-year-old caucasion male with the past medical history of hypertension, hepatic steatosis, gastritis and right inguinal hernia repair presents to [facility] IM clinic for follow-up. Patient states that he is doing well but admits that he does have bilateral shoulder pain that worsens with stretching his arms or picking up objects that causes him wait until the pain resolves before continuing activity. He describes his pain as burning pain that radiates to his elbow. He reports having a history of back trauma in 2003 and presented to [facility]. Will need to obtain medical records. Denies chest pain, SOB, weakness, fatigue, unintentional weight loss, lightheadedness, dizziness, abd pain, n/v, dysuria or peripheral edema.        Patient had a colonoscopy on 04/11/2022 which was filled 7 adenomatous polyps in the transverse colon and a adenomatous polyp in the sigmoid colon.  Patient is to have colonoscopy in 2025.  Patient had EGD in 2018 which revealed gastritis.  Patient denies fever, chills, lightheadedness, dizziness, chest pain, shortness a breath, abdominal pain, nausea, vomiting, dysuria, hematuria, hematochezia, diarrhea, constipation or peripheral edema.    Review of Systems:  10 point ROS negative except for HPI    Objective:   Vital Signs:  There were no vitals filed for this visit.       There is no height or weight on file to calculate BMI.     General:  Well developed, well nourished, no acute respiratory distress  Head: Normocephalic, atraumatic  Eyes: PERRL, EOMI, anicteric sclera  Throat: No posterior pharyngeal erythema or exudate, no tonsillar exudate  Neck: supple, normal ROM, no thyromegaly   CVS:  RRR, S1 and S2 normal, no murmurs, no added heart sounds, rubs, gallops, 2+ peripheral pulses  Resp:  Lungs clear to auscultation bilaterally, no wheezes, rales, or rhonci  GI:  Abdomen soft, non-tender, non-distended, normoactive bowel  "sounds  MSK:  No muscle atrophy, cyanosis, peripheral edema, full range of motion  Skin:  No rashes, ulcers, erythema  Neuro:  Alert and oriented x3, No focal neuro deficits, CNII-XII grossly intact  Psych:  Appropriate mood and affect         Laboratory:  Lab Results   Component Value Date    WBC 9.0 02/27/2022    HGB 15.2 02/27/2022    HCT 44.1 02/27/2022     02/27/2022    MCV 81.1 02/27/2022    RDW 13.6 02/27/2022    Lab Results   Component Value Date     01/16/2024    K 4.1 01/16/2024    CO2 27 01/16/2024    BUN 15.5 01/16/2024    CREATININE 0.98 01/16/2024    CALCIUM 9.5 01/16/2024      Lab Results   Component Value Date    HGBA1C 5.8 05/15/2020     (H) 05/15/2020    CREATININE 0.98 01/16/2024    CREATRANDUR 11.9 (L) 01/16/2024    CREATRANDUR 11.7 (L) 01/16/2024    PROTEINURINE <6.8 01/16/2024    Lab Results   Component Value Date    TSH 1.925 01/16/2024    [id number] 0.95 01/16/2024                .labDM:   Lab Results   Component Value Date    HGBA1C 5.8 05/15/2020     (H) 05/15/2020    CREATININE 0.98 01/16/2024    CREATRANDUR 11.9 (L) 01/16/2024    CREATRANDUR 11.7 (L) 01/16/2024    PROTEINURINE <6.8 01/16/2024     Thyroid:   Lab Results   Component Value Date    TSH 1.925 01/16/2024    [id number] 0.95 01/16/2024     Anemia: No results found for: "IRON", "TIBC", "FERRITIN", "[id number]", "FOLATE"    Current Medications:  Current Outpatient Medications   Medication Instructions    omeprazole (PRILOSEC) 20 mg, Oral, Daily        Assessment and Plan:     Bilateral Shoulder pain  - ordered XR bilateral shoulders and neck XR  - prescribed gabapentin 100 mg TID as needed  - referred patient to Physical therapy      HTN  - 116/70  - stable not on medication  - patient walks 5-6 miles daily and has decreased his alcohol intake  - eats a balanced diet of fruits and vegetables.        Hepatic Steatosis  - abdominal US 2/11/2022 hepatic steatosis with early changes from cirrhosis cannot be " excluded  - repeat abdominal US still reveals hepatic steatosis but improved from previous US  - patient is no longer a heavy drinker and walks 5-6 miles daily        Hx of Right inguinal hernia repair  - 7/06/2022 right inguinal hernia repaired  - incision is healing well and intact     Hx Gastritis  - EGD performed in 2018  - is off of omeprazole     Health maintenance      Transverse colon polyp x7 s/p  Polypectomy:  - colonoscopy 4/11/2022  - Adenomatous polyps.  - No evidence of malignancy or high grade dysplasia.  - repeat colonoscopy in 2025 per GI      Sigmoid colon polyp s/p Polypectomy:  - colonoscopy 4/11/2022  - Adenomatous polyp.  - No evidence of malignancy or high grade dysplasia.  - repeat colonoscopy in 2025 per GI       PSA - 1.63 on 8/5/2022  Zoster- 6/24/2021, 1/25/2022  Tdap - 6/15/2018  Hep c non reactive 2/22/2022  Health Maintenance   Topic Date Due    Colorectal Cancer Screening  04/11/2025    Lipid Panel  05/15/2025    TETANUS VACCINE  06/05/2028    Hepatitis C Screening  Completed    Shingles Vaccine  Completed                  Gee Gilman MD

## 2024-01-24 DIAGNOSIS — M25.511 BILATERAL SHOULDER PAIN, UNSPECIFIED CHRONICITY: Primary | ICD-10-CM

## 2024-01-24 DIAGNOSIS — M25.512 BILATERAL SHOULDER PAIN, UNSPECIFIED CHRONICITY: Primary | ICD-10-CM

## 2024-01-24 RX ORDER — DICLOFENAC SODIUM 10 MG/G
2 GEL TOPICAL 4 TIMES DAILY
Qty: 450 G | Refills: 1 | Status: SHIPPED | OUTPATIENT
Start: 2024-01-24

## 2024-05-13 ENCOUNTER — OFFICE VISIT (OUTPATIENT)
Dept: INTERNAL MEDICINE | Facility: CLINIC | Age: 54
End: 2024-05-13

## 2024-05-13 VITALS
HEART RATE: 70 BPM | HEIGHT: 66 IN | WEIGHT: 161.38 LBS | BODY MASS INDEX: 25.94 KG/M2 | TEMPERATURE: 99 F | SYSTOLIC BLOOD PRESSURE: 127 MMHG | DIASTOLIC BLOOD PRESSURE: 75 MMHG | RESPIRATION RATE: 18 BRPM | OXYGEN SATURATION: 96 %

## 2024-05-13 DIAGNOSIS — G89.29 CHRONIC LOW BACK PAIN WITH SCIATICA, SCIATICA LATERALITY UNSPECIFIED, UNSPECIFIED BACK PAIN LATERALITY: ICD-10-CM

## 2024-05-13 DIAGNOSIS — K29.70 GASTRITIS, PRESENCE OF BLEEDING UNSPECIFIED, UNSPECIFIED CHRONICITY, UNSPECIFIED GASTRITIS TYPE: ICD-10-CM

## 2024-05-13 DIAGNOSIS — K76.0 HEPATIC STEATOSIS: ICD-10-CM

## 2024-05-13 DIAGNOSIS — M54.40 CHRONIC LOW BACK PAIN WITH SCIATICA, SCIATICA LATERALITY UNSPECIFIED, UNSPECIFIED BACK PAIN LATERALITY: ICD-10-CM

## 2024-05-13 DIAGNOSIS — Z23 NEED FOR VACCINATION WITH 20-POLYVALENT PNEUMOCOCCAL CONJUGATE VACCINE: ICD-10-CM

## 2024-05-13 DIAGNOSIS — I10 HYPERTENSION, UNSPECIFIED TYPE: Primary | ICD-10-CM

## 2024-05-13 PROCEDURE — 90471 IMMUNIZATION ADMIN: CPT | Mod: PBBFAC

## 2024-05-13 PROCEDURE — 90677 PCV20 VACCINE IM: CPT | Mod: PBBFAC

## 2024-05-13 PROCEDURE — 99214 OFFICE O/P EST MOD 30 MIN: CPT | Mod: PBBFAC,25

## 2024-05-13 RX ADMIN — PNEUMOCOCCAL 20-VALENT CONJUGATE VACCINE 0.5 ML
2.2; 2.2; 2.2; 2.2; 2.2; 2.2; 2.2; 2.2; 2.2; 2.2; 2.2; 2.2; 2.2; 2.2; 2.2; 2.2; 4.4; 2.2; 2.2; 2.2 INJECTION, SUSPENSION INTRAMUSCULAR at 02:05

## 2024-05-13 NOTE — PROGRESS NOTES
[facility] Internal Medicine Resident Clinic    Subjective:        53-year-old caucasion male with the past medical history of hypertension, hepatic steatosis, gastritis and right inguinal hernia repair presents to [facility] IM clinic for follow-up. Patient states that he is doing well he is no longer having shoulder pain. He has non further complaints today. Denies chest pain, SOB, weakness, fatigue, unintentional weight loss, lightheadedness, dizziness, abd pain, n/v, dysuria or peripheral edema.      Review of Systems:  10 point ROS negative except for HPI    Objective:   Vital Signs:  Vitals:    05/13/24 1345   BP: 127/75   Pulse: 70   Resp: 18   Temp: 98.6 °F (37 °C)          Body mass index is 26.05 kg/m².     General:  Well developed, well nourished, no acute respiratory distress  Head: Normocephalic, atraumatic  Eyes: PERRL, EOMI, anicteric sclera  Throat: No posterior pharyngeal erythema or exudate, no tonsillar exudate  Neck: supple, normal ROM, no thyromegaly   CVS:  RRR, S1 and S2 normal, no murmurs, no added heart sounds, rubs, gallops, 2+ peripheral pulses  Resp:  Lungs clear to auscultation bilaterally, no wheezes, rales, or rhonci  GI:  Abdomen soft, non-tender, non-distended, normoactive bowel sounds  MSK:  No muscle atrophy, cyanosis, peripheral edema, full range of motion  Skin:  No rashes, ulcers, erythema  Neuro:  Alert and oriented x3, No focal neuro deficits, CNII-XII grossly intact  Psych:  Appropriate mood and affect         Laboratory:  Lab Results   Component Value Date    WBC 9.0 02/27/2022    HGB 15.2 02/27/2022    HCT 44.1 02/27/2022     02/27/2022    MCV 81.1 02/27/2022    RDW 13.6 02/27/2022    Lab Results   Component Value Date     01/16/2024    K 4.1 01/16/2024    CO2 27 01/16/2024    BUN 15.5 01/16/2024    CREATININE 0.98 01/16/2024    CALCIUM 9.5 01/16/2024      Lab Results   Component Value Date    HGBA1C 5.8 05/15/2020     (H) 05/15/2020    CREATININE 0.98 01/16/2024     "CREATRANDUR 11.9 (L) 01/16/2024    CREATRANDUR 11.7 (L) 01/16/2024    PROTEINURINE <6.8 01/16/2024    Lab Results   Component Value Date    TSH 1.925 01/16/2024    [id number] 0.95 01/16/2024                .labDM:   Lab Results   Component Value Date    HGBA1C 5.8 05/15/2020     (H) 05/15/2020    CREATININE 0.98 01/16/2024    CREATRANDUR 11.9 (L) 01/16/2024    CREATRANDUR 11.7 (L) 01/16/2024    PROTEINURINE <6.8 01/16/2024     Thyroid:   Lab Results   Component Value Date    TSH 1.925 01/16/2024    [id number] 0.95 01/16/2024     Anemia: No results found for: "IRON", "TIBC", "FERRITIN", "[id number]", "FOLATE"    Current Medications:  Current Outpatient Medications   Medication Instructions    diclofenac sodium (VOLTAREN) 2 g, Topical (Top), 4 times daily    gabapentin (NEURONTIN) 100 mg, Oral, 3 times daily PRN    omeprazole (PRILOSEC) 20 mg, Daily        Assessment and Plan:     HTN  - 127/75  - stable not on medication  - patient walks 5-6 miles daily and has decreased his alcohol intake  - eats a balanced diet of fruits and vegetables.      Hepatic Steatosis  - abdominal US 2/11/2022 hepatic steatosis with early changes from cirrhosis cannot be excluded  - repeat abdominal US still reveals hepatic steatosis but improved from previous US  - patient is no longer a heavy drinker and walks 5-6 miles daily        Hx of Right inguinal hernia repair  - 7/06/2022 right inguinal hernia repaired  - incision is healing well and intact     Hx Gastritis  - EGD performed in 2018  - is off of omeprazole    Postraumatic Thoracolumbar arthritis  -  CT lumbar spine on  1/20/2018 reveals Chronic T11 wedge deformity.  Status post L2 corpectomy with left lateral fusion from L1 to L3 and  posterior fusion from T10 through L4.  - currently managing conservatively. Gabapentin prescribed as needed.  - advised to monitor symptoms and if he is having difficulty ambulating, urinary or fecal incontinence then he need to report to " nearest emergency department.     Bilateral Shoulder pain ( resolved)  - left shoulder Xray revealed osteoarthritis and right shoulder xray was normal    Health maintenance      Transverse colon polyp x7 s/p  Polypectomy:  - colonoscopy 4/11/2022  - Adenomatous polyps.  - No evidence of malignancy or high grade dysplasia.  - repeat colonoscopy in 2025 per GI      Sigmoid colon polyp s/p Polypectomy:  - colonoscopy 4/11/2022  - Adenomatous polyp.  - No evidence of malignancy or high grade dysplasia.  - repeat colonoscopy in 2025 per GI           PSA - 1.63 on 8/5/2022  Zoster- 6/24/2021, 1/25/2022  Prevnar 20- 5/13/2024  Tdap - 6/15/2018  Hep c non reactive 2/22/2022  Health Maintenance   Topic Date Due    Colorectal Cancer Screening  04/11/2025    Lipid Panel  05/15/2025    TETANUS VACCINE  06/05/2028    Hepatitis C Screening  Completed    Shingles Vaccine  Completed                  Gee Gilman MD

## 2024-05-13 NOTE — PROGRESS NOTES
I have reviewed and concur with the resident's history, physical, assessment, and plan.  I have discussed with him all issues related to the diagnosis, workup and treatment plan. Care provided as reasonable and necessary.    Bulmaro Mejía MD  [facility]

## 2024-08-09 ENCOUNTER — HOSPITAL ENCOUNTER (EMERGENCY)
Facility: HOSPITAL | Age: 54
Discharge: HOME | End: 2024-08-09
Attending: STUDENT IN AN ORGANIZED HEALTH CARE EDUCATION/TRAINING PROGRAM

## 2024-08-09 VITALS
SYSTOLIC BLOOD PRESSURE: 131 MMHG | HEIGHT: 66 IN | HEART RATE: 59 BPM | OXYGEN SATURATION: 99 % | BODY MASS INDEX: 26.26 KG/M2 | DIASTOLIC BLOOD PRESSURE: 78 MMHG | TEMPERATURE: 98 F | RESPIRATION RATE: 18 BRPM | WEIGHT: 163.38 LBS

## 2024-08-09 DIAGNOSIS — S61.213A LACERATION OF LEFT MIDDLE FINGER WITHOUT FOREIGN BODY WITHOUT DAMAGE TO NAIL, INITIAL ENCOUNTER: Primary | ICD-10-CM

## 2024-08-09 PROCEDURE — 90471 IMMUNIZATION ADMIN: CPT

## 2024-08-09 PROCEDURE — 90715 TDAP VACCINE 7 YRS/> IM: CPT

## 2024-08-09 PROCEDURE — 12001 RPR S/N/AX/GEN/TRNK 2.5CM/<: CPT

## 2024-08-09 PROCEDURE — 99284 EMERGENCY DEPT VISIT MOD MDM: CPT | Mod: 25

## 2024-08-09 PROCEDURE — 63600175 PHARM REV CODE 636 W HCPCS

## 2024-08-09 RX ADMIN — TETANUS TOXOID, REDUCED DIPHTHERIA TOXOID AND ACELLULAR PERTUSSIS VACCINE, ADSORBED 0.5 ML: 5; 2.5; 8; 8; 2.5 SUSPENSION INTRAMUSCULAR at 06:08

## 2024-08-09 NOTE — ED PROVIDER NOTES
Encounter Date: 8/9/2024       History     Chief Complaint   Patient presents with    Laceration     Pt lacerated left 3rd finger when he caught a tipping dryer. Bleeding controlled. Minor.       Laceration   The incident occurred just prior to arrival. The laceration is located on the Left hand (rd digit, looney surface). Size: .5cm. The laceration mechanism was a a metal edge. The pain is at a severity of 0/10. He reports no foreign bodies present. His tetanus status is out of date.     Review of patient's allergies indicates:  No Known Allergies  Past Medical History:   Diagnosis Date    Back pain with history of spinal surgery     Hypertension      Past Surgical History:   Procedure Laterality Date    BACK SURGERY      BACK SURGERY      CHOLECYSTECTOMY      HERNIA REPAIR       Family History   Problem Relation Name Age of Onset    Cancer Mother      Hypertension Mother      Diabetes Mother      Autoimmune disease Father       Social History     Tobacco Use    Smoking status: Never    Smokeless tobacco: Never   Substance Use Topics    Alcohol use: Not Currently     Alcohol/week: 2.0 standard drinks of alcohol     Types: 2 Cans of beer per week     Comment: occasional    Drug use: Never     Review of Systems   Constitutional: Negative.    HENT: Negative.     Eyes: Negative.    Respiratory: Negative.     Cardiovascular: Negative.    Gastrointestinal: Negative.    Genitourinary: Negative.    Musculoskeletal: Negative.    Skin:  Positive for wound.   Neurological: Negative.        Physical Exam     Initial Vitals [08/09/24 1818]   BP Pulse Resp Temp SpO2   131/78 (!) 59 18 97.8 °F (36.6 °C) 99 %      MAP       --         Physical Exam    Nursing note and vitals reviewed.  Constitutional: He appears well-developed and well-nourished. He is cooperative.  Non-toxic appearance. He does not have a sickly appearance. He does not appear ill. No distress.   HENT:   Head: Normocephalic and atraumatic.   Right Ear: Hearing,  tympanic membrane and external ear normal.   Left Ear: Hearing, tympanic membrane and external ear normal.   Nose: Nose normal.   Mouth/Throat: Uvula is midline, oropharynx is clear and moist and mucous membranes are normal.   Eyes: Conjunctivae and EOM are normal. Pupils are equal, round, and reactive to light. No scleral icterus.   Neck: Trachea normal and phonation normal. Neck supple.   Normal range of motion.  Cardiovascular:  Normal rate, regular rhythm, normal heart sounds, intact distal pulses and normal pulses.           Pulmonary/Chest: Effort normal and breath sounds normal. No accessory muscle usage. No apnea, no tachypnea and no bradypnea. No respiratory distress. He has no decreased breath sounds. He has no wheezes. He has no rhonchi. He has no rales. He exhibits no tenderness.   Normal effort, symmetrical chest rise and fall, no accessory muscle use. Bilateral clear breath sounds in all fields anterior and posterior.      Abdominal: Abdomen is soft. Bowel sounds are normal. He exhibits no distension. There is no abdominal tenderness.   Abdomen is soft, nontender, no peritoneal signs. Tolerating PO fluids.      No right CVA tenderness.  No left CVA tenderness. There is no rebound, no guarding, no tenderness at McBurney's point and negative Obando's sign. negative psoas sign and negative Rovsing's sign  Musculoskeletal:         General: Normal range of motion.      Cervical back: Normal range of motion and neck supple.     Neurological: He is alert and oriented to person, place, and time. He has normal strength. Gait normal. GCS score is 15. GCS eye subscore is 4. GCS verbal subscore is 5. GCS motor subscore is 6.   Skin: Skin is warm and dry. Capillary refill takes less than 2 seconds. No rash noted.        Superficial .5 cm laceration looney surface left hand third digit, bleeding has stopped, area clean, edges well approximated. Full rom, sensation intact, cap refill < 2 sec   Psychiatric: He has a  normal mood and affect. His speech is normal and behavior is normal. Judgment and thought content normal. Cognition and memory are normal.         ED Course   Lac Repair    Date/Time: 8/9/2024 6:48 PM    Performed by: Yanni Vyas FNP  Authorized by: Louie Otero MD    Consent:     Consent obtained:  Verbal    Consent given by:  Patient    Risks, benefits, and alternatives were discussed: yes      Risks discussed:  Infection, need for additional repair, poor wound healing and pain    Alternatives discussed:  No treatment  Universal protocol:     Procedure explained and questions answered to patient or proxy's satisfaction: yes      Patient identity confirmed:  Verbally with patient and arm band  Anesthesia:     Anesthesia method:  None  Laceration details:     Location:  Finger    Finger location:  L long finger    Length (cm):  0.5    Depth (mm):  0.1  Pre-procedure details:     Preparation:  Patient was prepped and draped in usual sterile fashion  Exploration:     Wound exploration: entire depth of wound visualized      Contaminated: no    Treatment:     Area cleansed with:  Povidone-iodine, saline and soap and water    Amount of cleaning:  Standard    Irrigation solution:  Sterile water    Irrigation method:  Syringe    Visualized foreign bodies/material removed: no      Debridement:  Minimal    Undermining:  None  Skin repair:     Repair method:  Tissue adhesive  Approximation:     Approximation:  Close  Repair type:     Repair type:  Simple  Post-procedure details:     Dressing:  Non-adherent dressing    Procedure completion:  Tolerated well, no immediate complications    Labs Reviewed - No data to display       Imaging Results    None          Medications   Tdap (BOOSTRIX) vaccine injection 0.5 mL (0.5 mLs Intramuscular Given 8/9/24 5261)     Medical Decision Making  Cellulitis, abscess, mass, cyst laceration, among others      Superficial small laceration w/o blunt or crushing trauma  Bleeding  resolved  Area cleaned with 50cc sterile water  Adhesive applied, nonadherent dressing applied and wrapped  Tetanus updated    Patient is nontoxic appearing, no acute distress, stable vital signs.   The patient is resting comfortably in no acute distress.  hemodynamically stable and is without objective evidence for acute process requiring urgent intervention or hospitalization. I provided counseling to patient with regard to condition, the treatment plan, specific conditions for return, and the importance of follow up. Detailed written and verbal instructions provided to patient and he expressed a verbal understanding of the discharge instructions and overall management plan. Reiterated the importance of medication administration and safety and advised patient to follow up with primary care provider in 3-5 days or sooner if needed. Answered questions at this time. The patient is stable for discharge.       Risk  Prescription drug management.                                      Clinical Impression:  Final diagnoses:  [S61.213A] Laceration of left middle finger without foreign body without damage to nail, initial encounter (Primary)          ED Disposition Condition    Discharge Stable          ED Prescriptions    None       Follow-up Information       Follow up With Specialties Details Why Contact Info    Sabirna Parada MD Internal Medicine In 1 week  [address] [facility] [address]  [phone]      [facility] Emergency Medicine  If symptoms worsen [address] [facility] [id number] [phone]             Yanni Vyas, SARMAD  08/09/24 3404

## 2024-11-20 ENCOUNTER — HOSPITAL ENCOUNTER (EMERGENCY)
Facility: HOSPITAL | Age: 54
Discharge: HOME | End: 2024-11-20
Attending: EMERGENCY MEDICINE

## 2024-11-20 VITALS
HEART RATE: 62 BPM | TEMPERATURE: 98 F | BODY MASS INDEX: 27.14 KG/M2 | RESPIRATION RATE: 18 BRPM | SYSTOLIC BLOOD PRESSURE: 128 MMHG | HEIGHT: 66 IN | WEIGHT: 168.88 LBS | OXYGEN SATURATION: 99 % | DIASTOLIC BLOOD PRESSURE: 83 MMHG

## 2024-11-20 DIAGNOSIS — R20.8 BURNING SENSATION OF SKIN: Primary | ICD-10-CM

## 2024-11-20 PROCEDURE — 99283 EMERGENCY DEPT VISIT LOW MDM: CPT

## 2024-11-20 RX ORDER — VALACYCLOVIR HYDROCHLORIDE 1 G/1
1000 TABLET, FILM COATED ORAL 3 TIMES DAILY
Qty: 30 TABLET | Refills: 0 | Status: SHIPPED | OUTPATIENT
Start: 2024-11-20 | End: 2024-11-30

## 2024-11-21 NOTE — DISCHARGE INSTRUCTIONS
As we discussed, if you ever began to facial droop that is visible or any problems swallowing speaking or numbness in your face that were to occur especially does not cross the midline or numbness or weakness of arms or legs either on the left or right these or signs of stroke and should be attended to immediately.    In regards to the burning sensation if you begin to have burning that turns into pain that worsens especially with any rash please take antivirals that were prescribed and also seek medical evaluation in the emergency department or with your doctor immediately.

## 2024-11-21 NOTE — ED PROVIDER NOTES
"Encounter Date: 11/20/2024       History     Chief Complaint   Patient presents with    Headache    Numbness     Pt states 2 days ago he started having a posterior headache around 3 pm. Pt states he woke up this morning with left sided facial "heaviness" no facial droop noted, VAN negative in triage. States headache is better today. Dr torres notified.      Patient with no medical problems presents emergency department for concerns of a burning sensation on his left facial cheek.  No recent fevers or illnesses no history of heart attack or stroke does not take any prescription medications.  Denies any motor or sensory deficits that are new in his bilateral upper or lower extremities.  He states that he he had been working on some plumbing and some liquid see meant for the PVC pipe fell in his hair but not in his face.  After that time he was having neck tightness worse with bending his neck for the left in the right causing a mild headache in the back of his head but that has resolved without any intervention.  He denies any headache or vision changes at this time and never had any vision changes.  Denies any slurred speech.  Has been able to eat and drink without any difficulty.  No recent sinus congestion or blowing of his nose.    This morning when he woke up he felt burning sensation in his left facial cheek.  This has been persistent prompting him to come to the emergency department.      Review of patient's allergies indicates:  No Known Allergies  Past Medical History:   Diagnosis Date    Back pain with history of spinal surgery     Hypertension      Past Surgical History:   Procedure Laterality Date    BACK SURGERY      BACK SURGERY      CHOLECYSTECTOMY      HERNIA REPAIR       Family History   Problem Relation Name Age of Onset    Cancer Mother      Hypertension Mother      Diabetes Mother      Autoimmune disease Father       Social History     Tobacco Use    Smoking status: Never    Smokeless tobacco: Never "   Substance Use Topics    Alcohol use: Not Currently     Alcohol/week: 2.0 standard drinks of alcohol     Types: 2 Cans of beer per week     Comment: occasional    Drug use: Never     Review of Systems   Neurological:         Burning sensation in left facial cheek   All other systems reviewed and are negative.      Physical Exam     Initial Vitals [11/20/24 2002]   BP Pulse Resp Temp SpO2   128/83 70 18 98 °F (36.7 °C) 99 %      MAP       --         Physical Exam    Nursing note and vitals reviewed.  Constitutional: He appears well-developed and well-nourished.   HENT:   Head: Normocephalic and atraumatic.   Eyes: Conjunctivae and EOM are normal. Pupils are equal, round, and reactive to light. Right eye exhibits no discharge. Left eye exhibits no discharge.   Neck: Neck supple.   No tenderness or tightness of cervical paraspinal musculature   Normal range of motion.  Cardiovascular:  Normal rate and regular rhythm.           Pulmonary/Chest: Breath sounds normal. No respiratory distress. He has no wheezes. He has no rales.   Abdominal: Abdomen is soft. Bowel sounds are normal. He exhibits no distension. There is no abdominal tenderness. There is no rebound.   Musculoskeletal:         General: No tenderness. Normal range of motion.      Cervical back: Normal range of motion and neck supple.     Neurological: He is alert and oriented to person, place, and time. He has normal strength. No cranial nerve deficit or sensory deficit. GCS score is 15. GCS eye subscore is 4. GCS verbal subscore is 5. GCS motor subscore is 6.   When testing V1/V2/V3 areas of face equal and no sensory deficits.  This is repeated at beginning and ending up examination with no change   Skin:   Facial skin symmetrical in appearance no evidence of any rashes erythema abrasions or fluctuance of left and right maxillary facial area   Psychiatric: He has a normal mood and affect.         ED Course   Procedures  Labs Reviewed - No data to display        Imaging Results    None          Medications - No data to display  Medical Decision Making                        Medical Decision Making:   Initial Assessment:   Patient well-appearing in no distress normal neurologic exam in good historian with no sensory deficits when testing sensation of V1/V2/V3 area bilateral face  Differential Diagnosis:   Sinusitis, allergic reaction, shingles prodrome, neuropathy, nerve impingement, CVA  ED Management:  Did not feel patient's symptoms are consistent with CVA he has a burning-type sensation in the area of his maxillary face she will area on the left but no motor or sensory deficits of his entire face or body.  Cranial nerves 2-12 grossly intact.  No history of heart attack or stroke.  Does not take any prescription medications on a daily basis.    The patient did take in the past shingles vaccine but I did discuss with him if he begins to have pain or worsening burning with touching of the skin especially with any rash abruption to seek medical evaluation promptly for concerns of shingles.  I also discussed if he actually sees any facial droop problems swallowing or speaking or any motor or sensory deficits bilateral upper lower extremities to seek medical attention promptly whether this would occur tomorrow next week or next year.  Patient very friendly good historian understands and agrees with plan of care             Clinical Impression:  Final diagnoses:  [R20.8] Burning sensation of skin (Primary)          ED Disposition Condition    Discharge Stable          ED Prescriptions       Medication Sig Dispense Start Date End Date Auth. Provider    valACYclovir (VALTREX) 1000 MG tablet Take 1 tablet (1,000 mg total) by mouth 3 (three) times daily. for 10 days 30 tablet 11/20/2024 11/30/2024 Manoj Chew MD          Follow-up Information       Follow up With Specialties Details Why Contact Info    Sabrina Parada MD Internal Medicine In 2 days For medical  re-evaluation [address] [facility] [address]  [phone]      [facility] Emergency Medicine  As needed, If symptoms worsen [address] [facility] [phone]             Manoj Chew MD  11/20/24 2042

## 2025-02-17 ENCOUNTER — LAB VISIT (OUTPATIENT)
Dept: LAB | Facility: HOSPITAL | Age: 55
End: 2025-02-17

## 2025-02-17 ENCOUNTER — OFFICE VISIT (OUTPATIENT)
Dept: INTERNAL MEDICINE | Facility: CLINIC | Age: 55
End: 2025-02-17

## 2025-02-17 VITALS
OXYGEN SATURATION: 100 % | HEIGHT: 66 IN | TEMPERATURE: 98 F | BODY MASS INDEX: 26.9 KG/M2 | RESPIRATION RATE: 16 BRPM | DIASTOLIC BLOOD PRESSURE: 79 MMHG | SYSTOLIC BLOOD PRESSURE: 128 MMHG | WEIGHT: 167.38 LBS | HEART RATE: 55 BPM

## 2025-02-17 DIAGNOSIS — Z00.00 HEALTH CARE MAINTENANCE: ICD-10-CM

## 2025-02-17 DIAGNOSIS — Z00.00 HEALTH CARE MAINTENANCE: Primary | ICD-10-CM

## 2025-02-17 DIAGNOSIS — Z86.0100 HX OF COLONIC POLYPS: ICD-10-CM

## 2025-02-17 PROBLEM — K21.9 GASTROESOPHAGEAL REFLUX DISEASE: Status: ACTIVE | Noted: 2025-02-17

## 2025-02-17 PROBLEM — I10 HYPERTENSION: Status: ACTIVE | Noted: 2025-02-17

## 2025-02-17 LAB
ALBUMIN SERPL-MCNC: 4.2 G/DL (ref 3.5–5)
ALBUMIN/GLOB SERPL: 1.2 RATIO (ref 1.1–2)
ALP SERPL-CCNC: 58 UNIT/L (ref 40–150)
ALT SERPL-CCNC: 14 UNIT/L (ref 0–55)
ANION GAP SERPL CALC-SCNC: 3 MEQ/L
AST SERPL-CCNC: 15 UNIT/L (ref 5–34)
BASOPHILS # BLD AUTO: 0.03 X10(3)/MCL
BASOPHILS NFR BLD AUTO: 0.5 %
BILIRUB SERPL-MCNC: 0.6 MG/DL
BUN SERPL-MCNC: 18.1 MG/DL (ref 8.4–25.7)
CALCIUM SERPL-MCNC: 9.4 MG/DL (ref 8.4–10.2)
CHLORIDE SERPL-SCNC: 108 MMOL/L (ref 98–107)
CHOLEST SERPL-MCNC: 151 MG/DL
CHOLEST/HDLC SERPL: 3 {RATIO} (ref 0–5)
CO2 SERPL-SCNC: 28 MMOL/L (ref 22–29)
CREAT SERPL-MCNC: 0.86 MG/DL (ref 0.72–1.25)
CREAT/UREA NIT SERPL: 21
EOSINOPHIL # BLD AUTO: 0.08 X10(3)/MCL (ref 0–0.9)
EOSINOPHIL NFR BLD AUTO: 1.4 %
ERYTHROCYTE [DISTWIDTH] IN BLOOD BY AUTOMATED COUNT: 12 % (ref 11.5–17)
GFR SERPLBLD CREATININE-BSD FMLA CKD-EPI: >60 ML/MIN/1.73/M2
GLOBULIN SER-MCNC: 3.4 GM/DL (ref 2.4–3.5)
GLUCOSE SERPL-MCNC: 99 MG/DL (ref 74–100)
HCT VFR BLD AUTO: 47 % (ref 42–52)
HDLC SERPL-MCNC: 50 MG/DL (ref 35–60)
HGB BLD-MCNC: 15.6 G/DL (ref 14–18)
IMM GRANULOCYTES # BLD AUTO: 0.01 X10(3)/MCL (ref 0–0.04)
IMM GRANULOCYTES NFR BLD AUTO: 0.2 %
LDLC SERPL CALC-MCNC: 88 MG/DL (ref 50–140)
LYMPHOCYTES # BLD AUTO: 2.38 X10(3)/MCL (ref 0.6–4.6)
LYMPHOCYTES NFR BLD AUTO: 40.8 %
MCH RBC QN AUTO: 29.8 PG (ref 27–31)
MCHC RBC AUTO-ENTMCNC: 33.2 G/DL (ref 33–36)
MCV RBC AUTO: 89.9 FL (ref 80–94)
MONOCYTES # BLD AUTO: 0.46 X10(3)/MCL (ref 0.1–1.3)
MONOCYTES NFR BLD AUTO: 7.9 %
NEUTROPHILS # BLD AUTO: 2.87 X10(3)/MCL (ref 2.1–9.2)
NEUTROPHILS NFR BLD AUTO: 49.2 %
NRBC BLD AUTO-RTO: 0 %
PLATELET # BLD AUTO: 213 X10(3)/MCL (ref 130–400)
PMV BLD AUTO: 10.3 FL (ref 7.4–10.4)
POTASSIUM SERPL-SCNC: 4.4 MMOL/L (ref 3.5–5.1)
PROT SERPL-MCNC: 7.6 GM/DL (ref 6.4–8.3)
RBC # BLD AUTO: 5.23 X10(6)/MCL (ref 4.7–6.1)
SODIUM SERPL-SCNC: 139 MMOL/L (ref 136–145)
TRIGL SERPL-MCNC: 67 MG/DL (ref 34–140)
VLDLC SERPL CALC-MCNC: 13 MG/DL
WBC # BLD AUTO: 5.83 X10(3)/MCL (ref 4.5–11.5)

## 2025-02-17 PROCEDURE — 36415 COLL VENOUS BLD VENIPUNCTURE: CPT

## 2025-02-17 PROCEDURE — 90656 IIV3 VACC NO PRSV 0.5 ML IM: CPT | Mod: PBBFAC

## 2025-02-17 PROCEDURE — 99214 OFFICE O/P EST MOD 30 MIN: CPT | Mod: PBBFAC

## 2025-02-17 PROCEDURE — 90471 IMMUNIZATION ADMIN: CPT | Mod: PBBFAC

## 2025-02-17 PROCEDURE — 80053 COMPREHEN METABOLIC PANEL: CPT

## 2025-02-17 PROCEDURE — 85025 COMPLETE CBC W/AUTO DIFF WBC: CPT

## 2025-02-17 PROCEDURE — 80061 LIPID PANEL: CPT

## 2025-02-17 RX ADMIN — INFLUENZA VIRUS VACCINE 0.5 ML: 15; 15; 15 SUSPENSION INTRAMUSCULAR at 09:02

## 2025-02-17 NOTE — PROGRESS NOTES
[facility] Medicine Resident [facility]    Subjective:        53-year-old caucasion male with the past medical history of hypertension, hepatic steatosis, gastritis and right inguinal hernia repair presents to [facility] IM clinic for follow-up. Patient feeling well and with out complaints. Reports daily physical activity and good consistent diet and weight loss. Inquires about kidneys and liver, told her labs are good. The patient denies headaches, changes in vision, nausea, emesis, fevers, chills, chest pain, palpitations, dyspnea, abdominal pain, or changes in urinary or bowel habits.     Review of Systems:  The patient denies headaches, changes in vision, nausea, emesis, fevers, chills, chest pain, palpitations, dyspnea, abdominal pain, or changes in urinary or bowel habits.       Objective:   Vital Signs:  Vitals:    02/17/25 0815   BP: 128/79   Pulse: (!) 55   Resp: 16   Temp: 97.9 °F (36.6 °C)          Body mass index is 27.02 kg/m².     General:  Well developed, well nourished, no acute respiratory distress  Head: Normocephalic, atraumatic  Eyes: PERRL, EOMI, anicteric sclera  Throat: No posterior pharyngeal erythema or exudate, no tonsillar exudate  Neck: supple, normal ROM, no thyromegaly   CVS:  RRR, S1 and S2 normal, no murmurs, no added heart sounds, rubs, gallops, 2+ peripheral pulses  Resp:  Lungs clear to auscultation bilaterally, no wheezes, rales, or rhonci  GI:  Abdomen soft, non-tender, non-distended, normoactive bowel sounds  MSK:  No muscle atrophy, cyanosis, peripheral edema, full range of motion  Skin:  No rashes, ulcers, erythema  Neuro:  Alert and oriented x3, No focal neuro deficits, CNII-XII grossly intact  Psych:  Appropriate mood and affect         Laboratory:  Lab Results   Component Value Date    WBC 9.0 02/27/2022    HGB 15.2 02/27/2022    HCT 44.1 02/27/2022     02/27/2022    MCV 81.1 02/27/2022    RDW 13.6 02/27/2022    Lab Results   Component Value Date     01/16/2024    K  "4.1 01/16/2024     01/16/2024    CO2 27 01/16/2024    BUN 15.5 01/16/2024    CREATININE 0.98 01/16/2024    CALCIUM 9.5 01/16/2024      Lab Results   Component Value Date    HGBA1C 5.8 05/15/2020     (H) 05/15/2020    CREATININE 0.98 01/16/2024    CREATRANDUR 11.9 (L) 01/16/2024    CREATRANDUR 11.7 (L) 01/16/2024    PROTEINURINE <6.8 01/16/2024    Lab Results   Component Value Date    TSH 1.925 01/16/2024    [id number] 0.95 01/16/2024                .labDM:   Lab Results   Component Value Date    HGBA1C 5.8 05/15/2020     (H) 05/15/2020    CREATININE 0.98 01/16/2024    CREATRANDUR 11.9 (L) 01/16/2024    CREATRANDUR 11.7 (L) 01/16/2024    PROTEINURINE <6.8 01/16/2024     Thyroid:   Lab Results   Component Value Date    TSH 1.925 01/16/2024    [id number] 0.95 01/16/2024     Anemia: No results found for: "IRON", "TIBC", "FERRITIN", "[id number]", "FOLATE"    Current Medications:  Current Outpatient Medications   Medication Instructions    diclofenac sodium (VOLTAREN) 2 g, Topical (Top), 4 times daily    gabapentin (NEURONTIN) 100 mg, Oral, 3 times daily PRN    omeprazole (PRILOSEC) 20 mg, Daily    valACYclovir (VALTREX) 1,000 mg, Oral, 3 times daily        Assessment and Plan:     HTN  - 128/79  - stable not on medication  - patient walks 5-6 miles daily and has decreased his alcohol intake  - eats a balanced diet of fruits and vegetables.      Hepatic Steatosis  - abdominal US 2/11/2022 hepatic steatosis with early changes from cirrhosis cannot be excluded  - repeat abdominal US still reveals hepatic steatosis but improved from previous US  - patient is no longer a heavy drinker and walks 5-6 miles daily        Hx of Right inguinal hernia repair  - 7/06/2022 right inguinal hernia repaired  - incision is healing well and intact     Hx Gastritis  - EGD performed in 2018  - is off of omeprazole    Postraumatic Thoracolumbar arthritis  -  CT lumbar spine on  1/20/2018 reveals Chronic T11 wedge " deformity.  Status post L2 corpectomy with left lateral fusion from L1 to L3 and  posterior fusion from T10 through L4.  - currently managing conservatively. Can give gabapentin if needed  - advised to monitor symptoms and if he is having difficulty ambulating, urinary or fecal incontinence then he need to report to nearest emergency department.     Bilateral Shoulder pain ( resolved)  - left shoulder Xray revealed osteoarthritis and right shoulder xray was normal    Health maintenance      Transverse colon polyp x7 s/p  Polypectomy:  - colonoscopy 4/11/2022  - Adenomatous polyps.  - No evidence of malignancy or high grade dysplasia.  - repeat colonoscopy in 2025 per GI  - will refer GI      Sigmoid colon polyp s/p Polypectomy:  - colonoscopy 4/11/2022  - Adenomatous polyp.  - No evidence of malignancy or high grade dysplasia.  - repeat colonoscopy in 2025 per GI           PSA - 1.63 on 8/5/2022  Zoster- 6/24/2021, 1/25/2022  Prevnar 20- 5/13/2024  Tdap - 6/15/2018  Hep c non reactive 2/22/2022  Flu vax 2/2024  Health Maintenance   Topic Date Due    Influenza Vaccine (1) Never done    COVID-19 Vaccine (1 - 2024-25 season) Never done    Colorectal Cancer Screening  04/11/2025    Lipid Panel  05/15/2025    Hemoglobin A1c (Diabetic Prevention Screening)  01/22/2027    TETANUS VACCINE  08/09/2034    RSV Vaccine (Age 60+ and Pregnant patients) (1 - 1-dose 75+ series) 10/01/2045    Hepatitis C Screening  Completed    Shingles Vaccine  Completed    HIV Screening  Completed    Pneumococcal Vaccines (Age 50+)  Completed                  Sabrina Parada MD PGY-1  Internal Medicine

## 2025-02-17 NOTE — PROGRESS NOTES
I have reviewed and agree with the resident's findings, including all diagnostic interpretations and plans as written.    Joy Brar MD

## 2025-08-12 ENCOUNTER — LAB VISIT (OUTPATIENT)
Dept: LAB | Facility: HOSPITAL | Age: 55
End: 2025-08-12

## 2025-08-12 ENCOUNTER — OFFICE VISIT (OUTPATIENT)
Dept: INTERNAL MEDICINE | Facility: CLINIC | Age: 55
End: 2025-08-12

## 2025-08-12 VITALS
BODY MASS INDEX: 27.29 KG/M2 | RESPIRATION RATE: 16 BRPM | OXYGEN SATURATION: 99 % | TEMPERATURE: 98 F | HEART RATE: 55 BPM | SYSTOLIC BLOOD PRESSURE: 120 MMHG | HEIGHT: 66 IN | DIASTOLIC BLOOD PRESSURE: 71 MMHG | WEIGHT: 169.81 LBS

## 2025-08-12 DIAGNOSIS — M25.60 STIFFNESS IN JOINT: ICD-10-CM

## 2025-08-12 DIAGNOSIS — K76.0 HEPATIC STEATOSIS: ICD-10-CM

## 2025-08-12 DIAGNOSIS — E78.5 HYPERLIPIDEMIA, UNSPECIFIED HYPERLIPIDEMIA TYPE: ICD-10-CM

## 2025-08-12 DIAGNOSIS — I10 HYPERTENSION, UNSPECIFIED TYPE: Primary | ICD-10-CM

## 2025-08-12 LAB — RHEUMATOID FACT SERPL-ACNC: <13 IU/ML

## 2025-08-12 PROCEDURE — 86200 CCP ANTIBODY: CPT

## 2025-08-12 PROCEDURE — 86431 RHEUMATOID FACTOR QUANT: CPT | Mod: 59

## 2025-08-12 PROCEDURE — 86431 RHEUMATOID FACTOR QUANT: CPT

## 2025-08-12 PROCEDURE — 86039 ANTINUCLEAR ANTIBODIES (ANA): CPT

## 2025-08-12 PROCEDURE — 36415 COLL VENOUS BLD VENIPUNCTURE: CPT

## 2025-08-12 PROCEDURE — 99213 OFFICE O/P EST LOW 20 MIN: CPT | Mod: PBBFAC

## 2025-08-13 LAB
ANA SER QL HEP2 SUBST: NORMAL
CYCLIC CITRULLINATED PEPTIDE (CCP) (OHS): 0.7 U/ML
RHEUMATOID FACTOR IGA QUANTITATIVE (OLG): 3.6 IU/ML
RHEUMATOID FACTOR IGM QUANTITATIVE (OLG): <0.6 IU/ML

## (undated) DEVICE — SUT ABSRB PDS II 2-0 SH 27IN

## (undated) DEVICE — GLOVE PROTEXIS BLUE LATEX 6.5

## (undated) DEVICE — SUT 2/0 30IN SILK BLK BRAI

## (undated) DEVICE — SYR 10CC LUER LOCK

## (undated) DEVICE — SOL NACL IRR 1000ML BTL

## (undated) DEVICE — SUT PDSII 4-0 PS-2 CLEAR MO

## (undated) DEVICE — SPONGE KITTNER 1/4X 5/8 L STRL

## (undated) DEVICE — GLOVE PROTEXIS LTX MICRO  7.5

## (undated) DEVICE — CLIPPER BLADE MOD 4406 (CAREF)

## (undated) DEVICE — GOWN X-LG STERILE BACK

## (undated) DEVICE — GLOVE PROTEXIS BLUE LATEX 7.5

## (undated) DEVICE — MANIFOLD 4 PORT

## (undated) DEVICE — DRAIN PENROSE XRAY 18 X 1 ST

## (undated) DEVICE — ADHESIVE DERMABOND ADVANCED

## (undated) DEVICE — NDL HYPO REG 25G X 1 1/2

## (undated) DEVICE — HANDLE DEVON RIGID OR LIGHT

## (undated) DEVICE — GLOVE PROTEXIS LTX MICRO 6.5

## (undated) DEVICE — GLOVE PROTEXIS PI SYN SURG 7.5

## (undated) DEVICE — APPLICATOR CHLORAPREP ORN 26ML

## (undated) DEVICE — SUT 3-0 VICRYL / SH (J416)

## (undated) DEVICE — TIP SUCTION YANKAUER

## (undated) DEVICE — SUT 2-0 VICRYL / SH (J417)

## (undated) DEVICE — Device